# Patient Record
Sex: FEMALE | Race: WHITE | NOT HISPANIC OR LATINO | Employment: OTHER | URBAN - NONMETROPOLITAN AREA
[De-identification: names, ages, dates, MRNs, and addresses within clinical notes are randomized per-mention and may not be internally consistent; named-entity substitution may affect disease eponyms.]

---

## 2020-08-21 ENCOUNTER — TRANSCRIBE ORDERS (OUTPATIENT)
Dept: ADMINISTRATIVE | Facility: HOSPITAL | Age: 70
End: 2020-08-21

## 2020-08-21 ENCOUNTER — LAB (OUTPATIENT)
Dept: LAB | Facility: HOSPITAL | Age: 70
End: 2020-08-21

## 2020-08-21 DIAGNOSIS — Z01.818 PRE-OP TESTING: ICD-10-CM

## 2020-08-21 DIAGNOSIS — Z01.818 PRE-OP TESTING: Primary | ICD-10-CM

## 2020-08-21 PROCEDURE — C9803 HOPD COVID-19 SPEC COLLECT: HCPCS

## 2020-08-21 PROCEDURE — U0003 INFECTIOUS AGENT DETECTION BY NUCLEIC ACID (DNA OR RNA); SEVERE ACUTE RESPIRATORY SYNDROME CORONAVIRUS 2 (SARS-COV-2) (CORONAVIRUS DISEASE [COVID-19]), AMPLIFIED PROBE TECHNIQUE, MAKING USE OF HIGH THROUGHPUT TECHNOLOGIES AS DESCRIBED BY CMS-2020-01-R: HCPCS

## 2020-08-22 LAB
COVID LABCORP PRIORITY: NORMAL
SARS-COV-2 RNA RESP QL NAA+PROBE: NOT DETECTED

## 2020-12-15 ENCOUNTER — LAB (OUTPATIENT)
Dept: LAB | Facility: HOSPITAL | Age: 70
End: 2020-12-15

## 2020-12-15 DIAGNOSIS — Z01.818 PREOP TESTING: Primary | ICD-10-CM

## 2020-12-15 LAB — SARS-COV-2 ORF1AB RESP QL NAA+PROBE: NOT DETECTED

## 2020-12-15 PROCEDURE — U0004 COV-19 TEST NON-CDC HGH THRU: HCPCS

## 2020-12-15 PROCEDURE — C9803 HOPD COVID-19 SPEC COLLECT: HCPCS

## 2021-01-01 ENCOUNTER — APPOINTMENT (OUTPATIENT)
Dept: MRI IMAGING | Age: 71
DRG: 064 | End: 2021-01-01
Attending: FAMILY MEDICINE
Payer: MEDICARE

## 2021-01-01 ENCOUNTER — APPOINTMENT (OUTPATIENT)
Dept: GENERAL RADIOLOGY | Age: 71
DRG: 064 | End: 2021-01-01
Attending: FAMILY MEDICINE
Payer: MEDICARE

## 2021-01-01 ENCOUNTER — APPOINTMENT (OUTPATIENT)
Dept: ULTRASOUND IMAGING | Age: 71
DRG: 064 | End: 2021-01-01
Attending: FAMILY MEDICINE
Payer: MEDICARE

## 2021-01-01 ENCOUNTER — HOSPITAL ENCOUNTER (INPATIENT)
Age: 71
LOS: 4 days | Discharge: HOSPICE | DRG: 064 | End: 2021-05-31
Attending: FAMILY MEDICINE | Admitting: INTERNAL MEDICINE
Payer: MEDICARE

## 2021-01-01 VITALS
DIASTOLIC BLOOD PRESSURE: 57 MMHG | SYSTOLIC BLOOD PRESSURE: 160 MMHG | WEIGHT: 240 LBS | BODY MASS INDEX: 36.37 KG/M2 | OXYGEN SATURATION: 98 % | HEART RATE: 73 BPM | RESPIRATION RATE: 16 BRPM | TEMPERATURE: 97.8 F | HEIGHT: 68 IN

## 2021-01-01 DIAGNOSIS — G93.40 ACUTE ENCEPHALOPATHY: Primary | ICD-10-CM

## 2021-01-01 LAB
ADENOVIRUS BY PCR: NOT DETECTED
ALBUMIN SERPL-MCNC: 3.2 G/DL (ref 3.5–5.2)
ALBUMIN SERPL-MCNC: 3.7 G/DL (ref 3.5–5.2)
ALP BLD-CCNC: 110 U/L (ref 35–104)
ALP BLD-CCNC: 86 U/L (ref 35–104)
ALT SERPL-CCNC: 11 U/L (ref 5–33)
ALT SERPL-CCNC: 15 U/L (ref 5–33)
ANION GAP SERPL CALCULATED.3IONS-SCNC: 10 MMOL/L (ref 7–19)
ANION GAP SERPL CALCULATED.3IONS-SCNC: 12 MMOL/L (ref 7–19)
ANION GAP SERPL CALCULATED.3IONS-SCNC: 12 MMOL/L (ref 7–19)
ANION GAP SERPL CALCULATED.3IONS-SCNC: 13 MMOL/L (ref 7–19)
ANION GAP SERPL CALCULATED.3IONS-SCNC: 15 MMOL/L (ref 7–19)
AST SERPL-CCNC: 21 U/L (ref 5–32)
AST SERPL-CCNC: 23 U/L (ref 5–32)
BACTERIA: ABNORMAL /HPF
BASE EXCESS ARTERIAL: -14.6 MMOL/L (ref -2–2)
BASE EXCESS ARTERIAL: -6.4 MMOL/L (ref -2–2)
BASOPHILS ABSOLUTE: 0 K/UL (ref 0–0.2)
BASOPHILS ABSOLUTE: 0.1 K/UL (ref 0–0.2)
BASOPHILS ABSOLUTE: 0.1 K/UL (ref 0–0.2)
BASOPHILS RELATIVE PERCENT: 0.2 % (ref 0–1)
BASOPHILS RELATIVE PERCENT: 0.4 % (ref 0–1)
BILIRUB SERPL-MCNC: 0.6 MG/DL (ref 0.2–1.2)
BILIRUB SERPL-MCNC: 0.9 MG/DL (ref 0.2–1.2)
BILIRUBIN URINE: NEGATIVE
BLOOD CULTURE, ROUTINE: NORMAL
BLOOD, URINE: ABNORMAL
BORDETELLA PARAPERTUSSIS BY PCR: NOT DETECTED
BORDETELLA PERTUSSIS BY PCR: NOT DETECTED
BUN BLDV-MCNC: 35 MG/DL (ref 8–23)
BUN BLDV-MCNC: 39 MG/DL (ref 8–23)
BUN BLDV-MCNC: 61 MG/DL (ref 8–23)
BUN BLDV-MCNC: 72 MG/DL (ref 8–23)
BUN BLDV-MCNC: 76 MG/DL (ref 8–23)
CALCIUM SERPL-MCNC: 8.5 MG/DL (ref 8.8–10.2)
CALCIUM SERPL-MCNC: 8.8 MG/DL (ref 8.8–10.2)
CALCIUM SERPL-MCNC: 9 MG/DL (ref 8.8–10.2)
CALCIUM SERPL-MCNC: 9 MG/DL (ref 8.8–10.2)
CALCIUM SERPL-MCNC: 9.2 MG/DL (ref 8.8–10.2)
CARBOXYHEMOGLOBIN ARTERIAL: 1.6 % (ref 0–5)
CARBOXYHEMOGLOBIN ARTERIAL: 1.8 % (ref 0–5)
CHLAMYDOPHILIA PNEUMONIAE BY PCR: NOT DETECTED
CHLORIDE BLD-SCNC: 107 MMOL/L (ref 98–111)
CHLORIDE BLD-SCNC: 109 MMOL/L (ref 98–111)
CHLORIDE BLD-SCNC: 109 MMOL/L (ref 98–111)
CHLORIDE BLD-SCNC: 110 MMOL/L (ref 98–111)
CHLORIDE BLD-SCNC: 112 MMOL/L (ref 98–111)
CHOLESTEROL, TOTAL: 123 MG/DL (ref 160–199)
CLARITY: ABNORMAL
CO2: 14 MMOL/L (ref 22–29)
CO2: 19 MMOL/L (ref 22–29)
CO2: 21 MMOL/L (ref 22–29)
CO2: 22 MMOL/L (ref 22–29)
CO2: 22 MMOL/L (ref 22–29)
COLOR: YELLOW
CORONAVIRUS 229E BY PCR: NOT DETECTED
CORONAVIRUS HKU1 BY PCR: NOT DETECTED
CORONAVIRUS NL63 BY PCR: NOT DETECTED
CORONAVIRUS OC43 BY PCR: NOT DETECTED
CREAT SERPL-MCNC: 1.3 MG/DL (ref 0.5–0.9)
CREAT SERPL-MCNC: 1.4 MG/DL (ref 0.5–0.9)
CREAT SERPL-MCNC: 2 MG/DL (ref 0.5–0.9)
CREAT SERPL-MCNC: 2.5 MG/DL (ref 0.5–0.9)
CREAT SERPL-MCNC: 2.5 MG/DL (ref 0.5–0.9)
CREATININE URINE: 94.2 MG/DL (ref 4.2–622)
CULTURE, BLOOD 2: NORMAL
CULTURE, RESPIRATORY: NORMAL
EOSINOPHIL,URINE: NORMAL
EOSINOPHILS ABSOLUTE: 0 K/UL (ref 0–0.6)
EOSINOPHILS ABSOLUTE: 0.1 K/UL (ref 0–0.6)
EOSINOPHILS RELATIVE PERCENT: 0 % (ref 0–5)
EOSINOPHILS RELATIVE PERCENT: 0.3 % (ref 0–5)
EPITHELIAL CELLS, UA: ABNORMAL /HPF
GFR AFRICAN AMERICAN: 23
GFR AFRICAN AMERICAN: 23
GFR AFRICAN AMERICAN: 30
GFR AFRICAN AMERICAN: 45
GFR AFRICAN AMERICAN: 49
GFR NON-AFRICAN AMERICAN: 19
GFR NON-AFRICAN AMERICAN: 19
GFR NON-AFRICAN AMERICAN: 25
GFR NON-AFRICAN AMERICAN: 37
GFR NON-AFRICAN AMERICAN: 41
GLUCOSE BLD-MCNC: 110 MG/DL (ref 70–99)
GLUCOSE BLD-MCNC: 114 MG/DL (ref 70–99)
GLUCOSE BLD-MCNC: 123 MG/DL (ref 70–99)
GLUCOSE BLD-MCNC: 149 MG/DL (ref 70–99)
GLUCOSE BLD-MCNC: 150 MG/DL (ref 74–109)
GLUCOSE BLD-MCNC: 151 MG/DL (ref 70–99)
GLUCOSE BLD-MCNC: 151 MG/DL (ref 70–99)
GLUCOSE BLD-MCNC: 166 MG/DL (ref 70–99)
GLUCOSE BLD-MCNC: 174 MG/DL (ref 70–99)
GLUCOSE BLD-MCNC: 175 MG/DL (ref 74–109)
GLUCOSE BLD-MCNC: 184 MG/DL (ref 74–109)
GLUCOSE BLD-MCNC: 189 MG/DL (ref 74–109)
GLUCOSE BLD-MCNC: 195 MG/DL (ref 70–99)
GLUCOSE BLD-MCNC: 201 MG/DL (ref 74–109)
GLUCOSE BLD-MCNC: 245 MG/DL (ref 70–99)
GLUCOSE URINE: NEGATIVE MG/DL
GRAM STAIN RESULT: NORMAL
HBA1C MFR BLD: 6.3 % (ref 4–6)
HCO3 ARTERIAL: 13.2 MMOL/L (ref 22–26)
HCO3 ARTERIAL: 17.7 MMOL/L (ref 22–26)
HCT VFR BLD CALC: 29.4 % (ref 37–47)
HCT VFR BLD CALC: 30.2 % (ref 37–47)
HCT VFR BLD CALC: 31.4 % (ref 37–47)
HCT VFR BLD CALC: 33 % (ref 37–47)
HCT VFR BLD CALC: 37.8 % (ref 37–47)
HDLC SERPL-MCNC: 56 MG/DL (ref 65–121)
HEMOGLOBIN, ART, EXTENDED: 11.1 G/DL (ref 12–16)
HEMOGLOBIN, ART, EXTENDED: 12.6 G/DL (ref 12–16)
HEMOGLOBIN: 10.1 G/DL (ref 12–16)
HEMOGLOBIN: 10.5 G/DL (ref 12–16)
HEMOGLOBIN: 11.9 G/DL (ref 12–16)
HEMOGLOBIN: 9.7 G/DL (ref 12–16)
HEMOGLOBIN: 9.7 G/DL (ref 12–16)
HUMAN METAPNEUMOVIRUS BY PCR: NOT DETECTED
HUMAN RHINOVIRUS/ENTEROVIRUS BY PCR: NOT DETECTED
HYALINE CASTS: ABNORMAL /LPF (ref 0–5)
IMMATURE GRANULOCYTES #: 0.2 K/UL
IMMATURE GRANULOCYTES #: 0.2 K/UL
IMMATURE GRANULOCYTES #: 0.4 K/UL
IMMATURE GRANULOCYTES #: 0.4 K/UL
IMMATURE GRANULOCYTES #: 0.8 K/UL
INFLUENZA A BY PCR: NOT DETECTED
INFLUENZA B BY PCR: NOT DETECTED
KETONES, URINE: ABNORMAL MG/DL
L. PNEUMOPHILA SEROGP 1 UR AG: NORMAL
LACTIC ACID: 2 MMOL/L (ref 0.5–1.9)
LDL CHOLESTEROL CALCULATED: 49 MG/DL
LEUKOCYTE ESTERASE, URINE: ABNORMAL
LV EF: 58 %
LVEF MODALITY: NORMAL
LYMPHOCYTES ABSOLUTE: 0.7 K/UL (ref 1.1–4.5)
LYMPHOCYTES ABSOLUTE: 0.9 K/UL (ref 1.1–4.5)
LYMPHOCYTES ABSOLUTE: 0.9 K/UL (ref 1.1–4.5)
LYMPHOCYTES ABSOLUTE: 1 K/UL (ref 1.1–4.5)
LYMPHOCYTES ABSOLUTE: 1 K/UL (ref 1.1–4.5)
LYMPHOCYTES RELATIVE PERCENT: 2.6 % (ref 20–40)
LYMPHOCYTES RELATIVE PERCENT: 3.2 % (ref 20–40)
LYMPHOCYTES RELATIVE PERCENT: 4.1 % (ref 20–40)
LYMPHOCYTES RELATIVE PERCENT: 4.1 % (ref 20–40)
LYMPHOCYTES RELATIVE PERCENT: 5.4 % (ref 20–40)
MAGNESIUM: 1.8 MG/DL (ref 1.6–2.4)
MAGNESIUM: 1.8 MG/DL (ref 1.6–2.4)
MAGNESIUM: 1.9 MG/DL (ref 1.6–2.4)
MAGNESIUM: 2 MG/DL (ref 1.6–2.4)
MAGNESIUM: 2.2 MG/DL (ref 1.6–2.4)
MCH RBC QN AUTO: 30.3 PG (ref 27–31)
MCH RBC QN AUTO: 30.5 PG (ref 27–31)
MCH RBC QN AUTO: 30.7 PG (ref 27–31)
MCH RBC QN AUTO: 30.7 PG (ref 27–31)
MCH RBC QN AUTO: 31.1 PG (ref 27–31)
MCHC RBC AUTO-ENTMCNC: 31.5 G/DL (ref 33–37)
MCHC RBC AUTO-ENTMCNC: 31.8 G/DL (ref 33–37)
MCHC RBC AUTO-ENTMCNC: 32.1 G/DL (ref 33–37)
MCHC RBC AUTO-ENTMCNC: 32.2 G/DL (ref 33–37)
MCHC RBC AUTO-ENTMCNC: 33 G/DL (ref 33–37)
MCV RBC AUTO: 94.2 FL (ref 81–99)
MCV RBC AUTO: 95.1 FL (ref 81–99)
MCV RBC AUTO: 95.4 FL (ref 81–99)
MCV RBC AUTO: 95.6 FL (ref 81–99)
MCV RBC AUTO: 96.9 FL (ref 81–99)
METHEMOGLOBIN ARTERIAL: 1.1 %
METHEMOGLOBIN ARTERIAL: 1.6 %
MONOCYTES ABSOLUTE: 0.7 K/UL (ref 0–0.9)
MONOCYTES ABSOLUTE: 0.7 K/UL (ref 0–0.9)
MONOCYTES ABSOLUTE: 0.8 K/UL (ref 0–0.9)
MONOCYTES ABSOLUTE: 1 K/UL (ref 0–0.9)
MONOCYTES ABSOLUTE: 1 K/UL (ref 0–0.9)
MONOCYTES RELATIVE PERCENT: 2.9 % (ref 0–10)
MONOCYTES RELATIVE PERCENT: 3.2 % (ref 0–10)
MONOCYTES RELATIVE PERCENT: 3.6 % (ref 0–10)
MONOCYTES RELATIVE PERCENT: 3.9 % (ref 0–10)
MONOCYTES RELATIVE PERCENT: 4.3 % (ref 0–10)
MYCOPLASMA PNEUMONIAE BY PCR: NOT DETECTED
MYCOPLASMA PNEUMONIAE IGG: 0.08 U/L
MYCOPLASMA PNEUMONIAE IGM: 0.1 U/L
NEUTROPHILS ABSOLUTE: 17.4 K/UL (ref 1.5–7.5)
NEUTROPHILS ABSOLUTE: 18.1 K/UL (ref 1.5–7.5)
NEUTROPHILS ABSOLUTE: 19.4 K/UL (ref 1.5–7.5)
NEUTROPHILS ABSOLUTE: 22.2 K/UL (ref 1.5–7.5)
NEUTROPHILS ABSOLUTE: 31.7 K/UL (ref 1.5–7.5)
NEUTROPHILS RELATIVE PERCENT: 88.7 % (ref 50–65)
NEUTROPHILS RELATIVE PERCENT: 89.2 % (ref 50–65)
NEUTROPHILS RELATIVE PERCENT: 90.7 % (ref 50–65)
NEUTROPHILS RELATIVE PERCENT: 91 % (ref 50–65)
NEUTROPHILS RELATIVE PERCENT: 93.1 % (ref 50–65)
NITRITE, URINE: NEGATIVE
O2 CONTENT ARTERIAL: 15.6 ML/DL
O2 CONTENT ARTERIAL: 17.5 ML/DL
O2 SAT, ARTERIAL: 96.6 %
O2 SAT, ARTERIAL: 97.6 %
O2 THERAPY: ABNORMAL
O2 THERAPY: ABNORMAL
PARAINFLUENZA VIRUS 1 BY PCR: NOT DETECTED
PARAINFLUENZA VIRUS 2 BY PCR: NOT DETECTED
PARAINFLUENZA VIRUS 3 BY PCR: NOT DETECTED
PARAINFLUENZA VIRUS 4 BY PCR: NOT DETECTED
PARATHYROID HORMONE INTACT: 105.1 PG/ML (ref 15–65)
PCO2 ARTERIAL: 30 MMHG (ref 35–45)
PCO2 ARTERIAL: 37 MMHG (ref 35–45)
PDW BLD-RTO: 13 % (ref 11.5–14.5)
PDW BLD-RTO: 13.1 % (ref 11.5–14.5)
PDW BLD-RTO: 13.2 % (ref 11.5–14.5)
PERFORMED ON: ABNORMAL
PH ARTERIAL: 7.16 (ref 7.35–7.45)
PH ARTERIAL: 7.38 (ref 7.35–7.45)
PH UA: 5 (ref 5–8)
PHOSPHORUS: 3.2 MG/DL (ref 2.5–4.5)
PHOSPHORUS: 4.2 MG/DL (ref 2.5–4.5)
PLATELET # BLD: 137 K/UL (ref 130–400)
PLATELET # BLD: 144 K/UL (ref 130–400)
PLATELET # BLD: 151 K/UL (ref 130–400)
PLATELET # BLD: 183 K/UL (ref 130–400)
PLATELET # BLD: 235 K/UL (ref 130–400)
PMV BLD AUTO: 10.5 FL (ref 9.4–12.3)
PMV BLD AUTO: 10.8 FL (ref 9.4–12.3)
PMV BLD AUTO: 10.8 FL (ref 9.4–12.3)
PMV BLD AUTO: 10.9 FL (ref 9.4–12.3)
PMV BLD AUTO: 9.9 FL (ref 9.4–12.3)
PO2 ARTERIAL: 142 MMHG (ref 80–100)
PO2 ARTERIAL: 233 MMHG (ref 80–100)
POTASSIUM SERPL-SCNC: 4.2 MMOL/L (ref 3.5–5)
POTASSIUM SERPL-SCNC: 4.6 MMOL/L (ref 3.5–5)
POTASSIUM SERPL-SCNC: 4.8 MMOL/L (ref 3.5–5)
POTASSIUM SERPL-SCNC: 5.7 MMOL/L (ref 3.5–5)
POTASSIUM SERPL-SCNC: 6.4 MMOL/L (ref 3.5–5)
POTASSIUM, WHOLE BLOOD: 5.8
POTASSIUM, WHOLE BLOOD: 6.7
PROTEIN PROTEIN: 28 MG/DL (ref 15–45)
PROTEIN UA: 30 MG/DL
RBC # BLD: 3.12 M/UL (ref 4.2–5.4)
RBC # BLD: 3.16 M/UL (ref 4.2–5.4)
RBC # BLD: 3.29 M/UL (ref 4.2–5.4)
RBC # BLD: 3.47 M/UL (ref 4.2–5.4)
RBC # BLD: 3.9 M/UL (ref 4.2–5.4)
RBC UA: ABNORMAL /HPF (ref 0–2)
RESPIRATORY SYNCYTIAL VIRUS BY PCR: NOT DETECTED
SARS-COV-2, NAAT: NOT DETECTED
SARS-COV-2, PCR: NOT DETECTED
SODIUM BLD-SCNC: 136 MMOL/L (ref 136–145)
SODIUM BLD-SCNC: 140 MMOL/L (ref 136–145)
SODIUM BLD-SCNC: 141 MMOL/L (ref 136–145)
SODIUM BLD-SCNC: 144 MMOL/L (ref 136–145)
SODIUM BLD-SCNC: 146 MMOL/L (ref 136–145)
SODIUM URINE: 43 MMOL/L
SPECIFIC GRAVITY UA: 1.02 (ref 1–1.03)
STREP PNEUMONIAE ANTIGEN, URINE: NORMAL
TOTAL PROTEIN: 6.5 G/DL (ref 6.6–8.7)
TOTAL PROTEIN: 6.9 G/DL (ref 6.6–8.7)
TRIGL SERPL-MCNC: 89 MG/DL (ref 0–149)
TSH REFLEX FT4: 0.44 UIU/ML (ref 0.35–5.5)
UREA NITROGEN, UR: 933 MG/DL
UROBILINOGEN, URINE: 1 E.U./DL
VITAMIN D 25-HYDROXY: 28.7 NG/ML
WBC # BLD: 19.4 K/UL (ref 4.8–10.8)
WBC # BLD: 20.4 K/UL (ref 4.8–10.8)
WBC # BLD: 21.4 K/UL (ref 4.8–10.8)
WBC # BLD: 24.4 K/UL (ref 4.8–10.8)
WBC # BLD: 34 K/UL (ref 4.8–10.8)
WBC UA: ABNORMAL /HPF (ref 0–5)

## 2021-01-01 PROCEDURE — 84100 ASSAY OF PHOSPHORUS: CPT

## 2021-01-01 PROCEDURE — 2580000003 HC RX 258: Performed by: INTERNAL MEDICINE

## 2021-01-01 PROCEDURE — 94003 VENT MGMT INPAT SUBQ DAY: CPT

## 2021-01-01 PROCEDURE — 6360000002 HC RX W HCPCS: Performed by: INTERNAL MEDICINE

## 2021-01-01 PROCEDURE — 74018 RADEX ABDOMEN 1 VIEW: CPT

## 2021-01-01 PROCEDURE — 95816 EEG AWAKE AND DROWSY: CPT | Performed by: PSYCHIATRY & NEUROLOGY

## 2021-01-01 PROCEDURE — 87635 SARS-COV-2 COVID-19 AMP PRB: CPT

## 2021-01-01 PROCEDURE — 99233 SBSQ HOSP IP/OBS HIGH 50: CPT | Performed by: PSYCHIATRY & NEUROLOGY

## 2021-01-01 PROCEDURE — 80061 LIPID PANEL: CPT

## 2021-01-01 PROCEDURE — 80048 BASIC METABOLIC PNL TOTAL CA: CPT

## 2021-01-01 PROCEDURE — 83735 ASSAY OF MAGNESIUM: CPT

## 2021-01-01 PROCEDURE — 2700000000 HC OXYGEN THERAPY PER DAY

## 2021-01-01 PROCEDURE — 36415 COLL VENOUS BLD VENIPUNCTURE: CPT

## 2021-01-01 PROCEDURE — 83036 HEMOGLOBIN GLYCOSYLATED A1C: CPT

## 2021-01-01 PROCEDURE — 85025 COMPLETE CBC W/AUTO DIFF WBC: CPT

## 2021-01-01 PROCEDURE — 84300 ASSAY OF URINE SODIUM: CPT

## 2021-01-01 PROCEDURE — 87449 NOS EACH ORGANISM AG IA: CPT

## 2021-01-01 PROCEDURE — 6370000000 HC RX 637 (ALT 250 FOR IP): Performed by: INTERNAL MEDICINE

## 2021-01-01 PROCEDURE — 2000000000 HC ICU R&B

## 2021-01-01 PROCEDURE — 5A1945Z RESPIRATORY VENTILATION, 24-96 CONSECUTIVE HOURS: ICD-10-PCS | Performed by: INTERNAL MEDICINE

## 2021-01-01 PROCEDURE — 99223 1ST HOSP IP/OBS HIGH 75: CPT | Performed by: NURSE PRACTITIONER

## 2021-01-01 PROCEDURE — 87205 SMEAR GRAM STAIN: CPT

## 2021-01-01 PROCEDURE — 2500000003 HC RX 250 WO HCPCS: Performed by: INTERNAL MEDICINE

## 2021-01-01 PROCEDURE — C8929 TTE W OR WO FOL WCON,DOPPLER: HCPCS

## 2021-01-01 PROCEDURE — 76770 US EXAM ABDO BACK WALL COMP: CPT

## 2021-01-01 PROCEDURE — 84132 ASSAY OF SERUM POTASSIUM: CPT

## 2021-01-01 PROCEDURE — 83605 ASSAY OF LACTIC ACID: CPT

## 2021-01-01 PROCEDURE — 36600 WITHDRAWAL OF ARTERIAL BLOOD: CPT

## 2021-01-01 PROCEDURE — 81001 URINALYSIS AUTO W/SCOPE: CPT

## 2021-01-01 PROCEDURE — 86738 MYCOPLASMA ANTIBODY: CPT

## 2021-01-01 PROCEDURE — 36556 INSERT NON-TUNNEL CV CATH: CPT

## 2021-01-01 PROCEDURE — 0202U NFCT DS 22 TRGT SARS-COV-2: CPT

## 2021-01-01 PROCEDURE — 95819 EEG AWAKE AND ASLEEP: CPT

## 2021-01-01 PROCEDURE — 70551 MRI BRAIN STEM W/O DYE: CPT

## 2021-01-01 PROCEDURE — 84540 ASSAY OF URINE/UREA-N: CPT

## 2021-01-01 PROCEDURE — 31500 INSERT EMERGENCY AIRWAY: CPT

## 2021-01-01 PROCEDURE — 71045 X-RAY EXAM CHEST 1 VIEW: CPT

## 2021-01-01 PROCEDURE — 87070 CULTURE OTHR SPECIMN AEROBIC: CPT

## 2021-01-01 PROCEDURE — 84443 ASSAY THYROID STIM HORMONE: CPT

## 2021-01-01 PROCEDURE — 99223 1ST HOSP IP/OBS HIGH 75: CPT | Performed by: PSYCHIATRY & NEUROLOGY

## 2021-01-01 PROCEDURE — 0BH17EZ INSERTION OF ENDOTRACHEAL AIRWAY INTO TRACHEA, VIA NATURAL OR ARTIFICIAL OPENING: ICD-10-PCS | Performed by: INTERNAL MEDICINE

## 2021-01-01 PROCEDURE — 82803 BLOOD GASES ANY COMBINATION: CPT

## 2021-01-01 PROCEDURE — 82306 VITAMIN D 25 HYDROXY: CPT

## 2021-01-01 PROCEDURE — 80053 COMPREHEN METABOLIC PANEL: CPT

## 2021-01-01 PROCEDURE — 83970 ASSAY OF PARATHORMONE: CPT

## 2021-01-01 PROCEDURE — 82947 ASSAY GLUCOSE BLOOD QUANT: CPT

## 2021-01-01 PROCEDURE — 6360000004 HC RX CONTRAST MEDICATION: Performed by: INTERNAL MEDICINE

## 2021-01-01 PROCEDURE — 2500000003 HC RX 250 WO HCPCS

## 2021-01-01 PROCEDURE — 82570 ASSAY OF URINE CREATININE: CPT

## 2021-01-01 PROCEDURE — 87040 BLOOD CULTURE FOR BACTERIA: CPT

## 2021-01-01 PROCEDURE — 02HV33Z INSERTION OF INFUSION DEVICE INTO SUPERIOR VENA CAVA, PERCUTANEOUS APPROACH: ICD-10-PCS | Performed by: INTERNAL MEDICINE

## 2021-01-01 PROCEDURE — 6360000002 HC RX W HCPCS

## 2021-01-01 PROCEDURE — 84156 ASSAY OF PROTEIN URINE: CPT

## 2021-01-01 PROCEDURE — 94002 VENT MGMT INPAT INIT DAY: CPT

## 2021-01-01 PROCEDURE — 93880 EXTRACRANIAL BILAT STUDY: CPT

## 2021-01-01 RX ORDER — SODIUM CHLORIDE, SODIUM LACTATE, POTASSIUM CHLORIDE, CALCIUM CHLORIDE 600; 310; 30; 20 MG/100ML; MG/100ML; MG/100ML; MG/100ML
INJECTION, SOLUTION INTRAVENOUS CONTINUOUS
Status: DISCONTINUED | OUTPATIENT
Start: 2021-01-01 | End: 2021-01-01

## 2021-01-01 RX ORDER — GLIPIZIDE 5 MG/1
10 TABLET ORAL DAILY
COMMUNITY

## 2021-01-01 RX ORDER — LEVOTHYROXINE SODIUM 112 UG/1
112 TABLET ORAL DAILY
COMMUNITY

## 2021-01-01 RX ORDER — SIMVASTATIN 20 MG
20 TABLET ORAL DAILY
COMMUNITY

## 2021-01-01 RX ORDER — ERGOCALCIFEROL 1.25 MG/1
50000 CAPSULE ORAL WEEKLY
Status: DISCONTINUED | OUTPATIENT
Start: 2021-01-01 | End: 2021-01-01 | Stop reason: HOSPADM

## 2021-01-01 RX ORDER — HYDROCODONE BITARTRATE AND ACETAMINOPHEN 7.5; 325 MG/1; MG/1
1 TABLET ORAL 3 TIMES DAILY
COMMUNITY

## 2021-01-01 RX ORDER — ATORVASTATIN CALCIUM 10 MG/1
10 TABLET, FILM COATED ORAL NIGHTLY
Status: DISCONTINUED | OUTPATIENT
Start: 2021-01-01 | End: 2021-01-01

## 2021-01-01 RX ORDER — ALPRAZOLAM 0.5 MG/1
0.5 TABLET ORAL 3 TIMES DAILY PRN
COMMUNITY

## 2021-01-01 RX ORDER — CHOLECALCIFEROL (VITAMIN D3) 1250 MCG
1 CAPSULE ORAL DAILY
Status: DISCONTINUED | OUTPATIENT
Start: 2021-01-01 | End: 2021-01-01

## 2021-01-01 RX ORDER — PANTOPRAZOLE SODIUM 40 MG/1
40 TABLET, DELAYED RELEASE ORAL 2 TIMES DAILY
COMMUNITY

## 2021-01-01 RX ORDER — SPIRONOLACTONE 25 MG/1
25 TABLET ORAL 2 TIMES DAILY
COMMUNITY

## 2021-01-01 RX ORDER — ATROPINE SULFATE 10 MG/ML
1 SOLUTION/ DROPS OPHTHALMIC EVERY 4 HOURS PRN
Status: DISCONTINUED | OUTPATIENT
Start: 2021-01-01 | End: 2021-01-01 | Stop reason: HOSPADM

## 2021-01-01 RX ORDER — SPIRONOLACTONE 25 MG/1
25 TABLET ORAL 2 TIMES DAILY
Status: DISCONTINUED | OUTPATIENT
Start: 2021-01-01 | End: 2021-01-01 | Stop reason: HOSPADM

## 2021-01-01 RX ORDER — CLONIDINE 0.3 MG/24H
1 PATCH, EXTENDED RELEASE TRANSDERMAL WEEKLY
COMMUNITY

## 2021-01-01 RX ORDER — METOPROLOL SUCCINATE 50 MG/1
100 TABLET, EXTENDED RELEASE ORAL DAILY
Status: DISCONTINUED | OUTPATIENT
Start: 2021-01-01 | End: 2021-01-01

## 2021-01-01 RX ORDER — DEXTROSE MONOHYDRATE 50 MG/ML
100 INJECTION, SOLUTION INTRAVENOUS PRN
Status: DISCONTINUED | OUTPATIENT
Start: 2021-01-01 | End: 2021-01-01 | Stop reason: SDUPTHER

## 2021-01-01 RX ORDER — PAROXETINE HYDROCHLORIDE 20 MG/1
20 TABLET, FILM COATED ORAL DAILY
Status: DISCONTINUED | OUTPATIENT
Start: 2021-01-01 | End: 2021-01-01 | Stop reason: HOSPADM

## 2021-01-01 RX ORDER — HYDRALAZINE HYDROCHLORIDE 20 MG/ML
10 INJECTION INTRAMUSCULAR; INTRAVENOUS EVERY 6 HOURS PRN
Status: DISCONTINUED | OUTPATIENT
Start: 2021-01-01 | End: 2021-01-01 | Stop reason: HOSPADM

## 2021-01-01 RX ORDER — DEXTROSE MONOHYDRATE 50 MG/ML
100 INJECTION, SOLUTION INTRAVENOUS PRN
Status: DISCONTINUED | OUTPATIENT
Start: 2021-01-01 | End: 2021-01-01 | Stop reason: HOSPADM

## 2021-01-01 RX ORDER — DEXTROSE MONOHYDRATE 25 G/50ML
12.5 INJECTION, SOLUTION INTRAVENOUS PRN
Status: DISCONTINUED | OUTPATIENT
Start: 2021-01-01 | End: 2021-01-01 | Stop reason: SDUPTHER

## 2021-01-01 RX ORDER — NICOTINE POLACRILEX 4 MG
15 LOZENGE BUCCAL PRN
Status: DISCONTINUED | OUTPATIENT
Start: 2021-01-01 | End: 2021-01-01 | Stop reason: SDUPTHER

## 2021-01-01 RX ORDER — CALCIUM GLUCONATE 94 MG/ML
1000 INJECTION, SOLUTION INTRAVENOUS ONCE
Status: COMPLETED | OUTPATIENT
Start: 2021-01-01 | End: 2021-01-01

## 2021-01-01 RX ORDER — PROPOFOL 10 MG/ML
5-50 INJECTION, EMULSION INTRAVENOUS
Status: DISCONTINUED | OUTPATIENT
Start: 2021-01-01 | End: 2021-01-01 | Stop reason: HOSPADM

## 2021-01-01 RX ORDER — PROPOFOL 10 MG/ML
INJECTION, EMULSION INTRAVENOUS
Status: COMPLETED
Start: 2021-01-01 | End: 2021-01-01

## 2021-01-01 RX ORDER — FUROSEMIDE 20 MG/1
20 TABLET ORAL DAILY
Status: DISCONTINUED | OUTPATIENT
Start: 2021-01-01 | End: 2021-01-01 | Stop reason: HOSPADM

## 2021-01-01 RX ORDER — LISINOPRIL 20 MG/1
20 TABLET ORAL 2 TIMES DAILY
COMMUNITY

## 2021-01-01 RX ORDER — ATORVASTATIN CALCIUM 40 MG/1
40 TABLET, FILM COATED ORAL NIGHTLY
Status: DISCONTINUED | OUTPATIENT
Start: 2021-01-01 | End: 2021-01-01

## 2021-01-01 RX ORDER — LABETALOL HYDROCHLORIDE 5 MG/ML
20 INJECTION, SOLUTION INTRAVENOUS EVERY 4 HOURS PRN
Status: DISCONTINUED | OUTPATIENT
Start: 2021-01-01 | End: 2021-01-01 | Stop reason: HOSPADM

## 2021-01-01 RX ORDER — LISINOPRIL 20 MG/1
20 TABLET ORAL DAILY
Status: DISCONTINUED | OUTPATIENT
Start: 2021-01-01 | End: 2021-01-01

## 2021-01-01 RX ORDER — METOPROLOL SUCCINATE 100 MG/1
100 TABLET, EXTENDED RELEASE ORAL DAILY
COMMUNITY

## 2021-01-01 RX ORDER — SCOLOPAMINE TRANSDERMAL SYSTEM 1 MG/1
1 PATCH, EXTENDED RELEASE TRANSDERMAL
Status: DISCONTINUED | OUTPATIENT
Start: 2021-01-01 | End: 2021-01-01 | Stop reason: HOSPADM

## 2021-01-01 RX ORDER — DEXTROSE MONOHYDRATE 25 G/50ML
12.5 INJECTION, SOLUTION INTRAVENOUS PRN
Status: DISCONTINUED | OUTPATIENT
Start: 2021-01-01 | End: 2021-01-01 | Stop reason: HOSPADM

## 2021-01-01 RX ORDER — LEVOTHYROXINE SODIUM 112 UG/1
112 TABLET ORAL DAILY
Status: DISCONTINUED | OUTPATIENT
Start: 2021-01-01 | End: 2021-01-01 | Stop reason: HOSPADM

## 2021-01-01 RX ORDER — FUROSEMIDE 20 MG/1
20 TABLET ORAL PRN
COMMUNITY

## 2021-01-01 RX ORDER — NICOTINE POLACRILEX 4 MG
15 LOZENGE BUCCAL PRN
Status: DISCONTINUED | OUTPATIENT
Start: 2021-01-01 | End: 2021-01-01 | Stop reason: HOSPADM

## 2021-01-01 RX ORDER — CHOLECALCIFEROL (VITAMIN D3) 1250 MCG
1 CAPSULE ORAL WEEKLY
COMMUNITY

## 2021-01-01 RX ORDER — ATORVASTATIN CALCIUM 20 MG/1
20 TABLET, FILM COATED ORAL NIGHTLY
Status: DISCONTINUED | OUTPATIENT
Start: 2021-01-01 | End: 2021-01-01 | Stop reason: HOSPADM

## 2021-01-01 RX ORDER — LISINOPRIL 20 MG/1
20 TABLET ORAL DAILY
Status: DISCONTINUED | OUTPATIENT
Start: 2021-01-01 | End: 2021-01-01 | Stop reason: HOSPADM

## 2021-01-01 RX ORDER — DEXTROSE MONOHYDRATE 25 G/50ML
25 INJECTION, SOLUTION INTRAVENOUS ONCE
Status: COMPLETED | OUTPATIENT
Start: 2021-01-01 | End: 2021-01-01

## 2021-01-01 RX ORDER — PAROXETINE HYDROCHLORIDE 20 MG/1
20 TABLET, FILM COATED ORAL DAILY
COMMUNITY

## 2021-01-01 RX ORDER — METOPROLOL TARTRATE 50 MG/1
50 TABLET, FILM COATED ORAL 2 TIMES DAILY
Status: DISCONTINUED | OUTPATIENT
Start: 2021-01-01 | End: 2021-01-01 | Stop reason: HOSPADM

## 2021-01-01 RX ORDER — CLONIDINE 0.1 MG/24H
1 PATCH, EXTENDED RELEASE TRANSDERMAL WEEKLY
Status: DISCONTINUED | OUTPATIENT
Start: 2021-01-01 | End: 2021-01-01 | Stop reason: HOSPADM

## 2021-01-01 RX ORDER — CLONIDINE 0.1 MG/24H
1 PATCH, EXTENDED RELEASE TRANSDERMAL WEEKLY
Status: DISCONTINUED | OUTPATIENT
Start: 2021-01-01 | End: 2021-01-01

## 2021-01-01 RX ADMIN — PIPERACILLIN SODIUM AND TAZOBACTAM SODIUM 3375 MG: 3; .375 INJECTION, POWDER, LYOPHILIZED, FOR SOLUTION INTRAVENOUS at 00:17

## 2021-01-01 RX ADMIN — INSULIN LISPRO 1 UNITS: 100 INJECTION, SOLUTION INTRAVENOUS; SUBCUTANEOUS at 20:39

## 2021-01-01 RX ADMIN — FAMOTIDINE 20 MG: 10 INJECTION, SOLUTION INTRAVENOUS at 20:38

## 2021-01-01 RX ADMIN — HYDRALAZINE HYDROCHLORIDE 10 MG: 20 INJECTION INTRAMUSCULAR; INTRAVENOUS at 04:04

## 2021-01-01 RX ADMIN — PIPERACILLIN SODIUM AND TAZOBACTAM SODIUM 3375 MG: 3; .375 INJECTION, POWDER, LYOPHILIZED, FOR SOLUTION INTRAVENOUS at 11:29

## 2021-01-01 RX ADMIN — METOPROLOL TARTRATE 50 MG: 50 TABLET, FILM COATED ORAL at 20:38

## 2021-01-01 RX ADMIN — PIPERACILLIN SODIUM AND TAZOBACTAM SODIUM 3375 MG: 3; .375 INJECTION, POWDER, LYOPHILIZED, FOR SOLUTION INTRAVENOUS at 11:02

## 2021-01-01 RX ADMIN — Medication 50 MEQ: at 16:45

## 2021-01-01 RX ADMIN — PERFLUTREN 1.65 MG: 6.52 INJECTION, SUSPENSION INTRAVENOUS at 13:50

## 2021-01-01 RX ADMIN — HYDRALAZINE HYDROCHLORIDE 10 MG: 20 INJECTION INTRAMUSCULAR; INTRAVENOUS at 21:48

## 2021-01-01 RX ADMIN — HYDRALAZINE HYDROCHLORIDE 10 MG: 20 INJECTION INTRAMUSCULAR; INTRAVENOUS at 10:07

## 2021-01-01 RX ADMIN — FAMOTIDINE 20 MG: 10 INJECTION, SOLUTION INTRAVENOUS at 07:10

## 2021-01-01 RX ADMIN — PIPERACILLIN SODIUM AND TAZOBACTAM SODIUM 3375 MG: 3; .375 INJECTION, POWDER, LYOPHILIZED, FOR SOLUTION INTRAVENOUS at 12:24

## 2021-01-01 RX ADMIN — PIPERACILLIN SODIUM AND TAZOBACTAM SODIUM 3375 MG: 3; .375 INJECTION, POWDER, LYOPHILIZED, FOR SOLUTION INTRAVENOUS at 05:41

## 2021-01-01 RX ADMIN — FAMOTIDINE 20 MG: 10 INJECTION, SOLUTION INTRAVENOUS at 22:04

## 2021-01-01 RX ADMIN — PIPERACILLIN SODIUM AND TAZOBACTAM SODIUM 3375 MG: 3; .375 INJECTION, POWDER, LYOPHILIZED, FOR SOLUTION INTRAVENOUS at 05:02

## 2021-01-01 RX ADMIN — PIPERACILLIN SODIUM AND TAZOBACTAM SODIUM 3375 MG: 3; .375 INJECTION, POWDER, LYOPHILIZED, FOR SOLUTION INTRAVENOUS at 16:34

## 2021-01-01 RX ADMIN — FAMOTIDINE 20 MG: 10 INJECTION, SOLUTION INTRAVENOUS at 07:35

## 2021-01-01 RX ADMIN — SODIUM CHLORIDE, POTASSIUM CHLORIDE, SODIUM LACTATE AND CALCIUM CHLORIDE: 600; 310; 30; 20 INJECTION, SOLUTION INTRAVENOUS at 03:33

## 2021-01-01 RX ADMIN — FAMOTIDINE 20 MG: 10 INJECTION, SOLUTION INTRAVENOUS at 08:25

## 2021-01-01 RX ADMIN — PIPERACILLIN SODIUM AND TAZOBACTAM SODIUM 3375 MG: 3; .375 INJECTION, POWDER, LYOPHILIZED, FOR SOLUTION INTRAVENOUS at 17:20

## 2021-01-01 RX ADMIN — ENOXAPARIN SODIUM 40 MG: 40 INJECTION SUBCUTANEOUS at 12:43

## 2021-01-01 RX ADMIN — PIPERACILLIN SODIUM AND TAZOBACTAM SODIUM 3375 MG: 3; .375 INJECTION, POWDER, LYOPHILIZED, FOR SOLUTION INTRAVENOUS at 16:43

## 2021-01-01 RX ADMIN — METOPROLOL SUCCINATE 100 MG: 50 TABLET, EXTENDED RELEASE ORAL at 16:34

## 2021-01-01 RX ADMIN — PIPERACILLIN SODIUM AND TAZOBACTAM SODIUM 3375 MG: 3; .375 INJECTION, POWDER, LYOPHILIZED, FOR SOLUTION INTRAVENOUS at 06:08

## 2021-01-01 RX ADMIN — PIPERACILLIN SODIUM AND TAZOBACTAM SODIUM 3375 MG: 3; .375 INJECTION, POWDER, LYOPHILIZED, FOR SOLUTION INTRAVENOUS at 12:43

## 2021-01-01 RX ADMIN — PIPERACILLIN SODIUM AND TAZOBACTAM SODIUM 3375 MG: 3; .375 INJECTION, POWDER, LYOPHILIZED, FOR SOLUTION INTRAVENOUS at 04:47

## 2021-01-01 RX ADMIN — INSULIN HUMAN 10 UNITS: 100 INJECTION, SOLUTION PARENTERAL at 17:21

## 2021-01-01 RX ADMIN — PROPOFOL 15 MCG/KG/MIN: 10 INJECTION, EMULSION INTRAVENOUS at 04:47

## 2021-01-01 RX ADMIN — Medication 20 MG: at 18:46

## 2021-01-01 RX ADMIN — METOPROLOL TARTRATE 50 MG: 50 TABLET, FILM COATED ORAL at 08:25

## 2021-01-01 RX ADMIN — PIPERACILLIN SODIUM AND TAZOBACTAM SODIUM 3375 MG: 3; .375 INJECTION, POWDER, LYOPHILIZED, FOR SOLUTION INTRAVENOUS at 23:11

## 2021-01-01 RX ADMIN — SODIUM CHLORIDE, POTASSIUM CHLORIDE, SODIUM LACTATE AND CALCIUM CHLORIDE: 600; 310; 30; 20 INJECTION, SOLUTION INTRAVENOUS at 00:01

## 2021-01-01 RX ADMIN — PROPOFOL 15 MCG/KG/MIN: 10 INJECTION, EMULSION INTRAVENOUS at 17:20

## 2021-01-01 RX ADMIN — PIPERACILLIN SODIUM AND TAZOBACTAM SODIUM 3375 MG: 3; .375 INJECTION, POWDER, LYOPHILIZED, FOR SOLUTION INTRAVENOUS at 22:40

## 2021-01-01 RX ADMIN — SODIUM BICARBONATE 50 MEQ: 84 INJECTION, SOLUTION INTRAVENOUS at 16:45

## 2021-01-01 RX ADMIN — LEVOTHYROXINE SODIUM 112 MCG: 112 TABLET ORAL at 08:25

## 2021-01-01 RX ADMIN — PROPOFOL 30 MCG/KG/MIN: 10 INJECTION, EMULSION INTRAVENOUS at 14:44

## 2021-01-01 RX ADMIN — LISINOPRIL 20 MG: 20 TABLET ORAL at 11:29

## 2021-01-01 RX ADMIN — SODIUM CHLORIDE, POTASSIUM CHLORIDE, SODIUM LACTATE AND CALCIUM CHLORIDE: 600; 310; 30; 20 INJECTION, SOLUTION INTRAVENOUS at 16:04

## 2021-01-01 RX ADMIN — LEVOTHYROXINE SODIUM 112 MCG: 112 TABLET ORAL at 08:09

## 2021-01-01 RX ADMIN — PROPOFOL 15 MCG/KG/MIN: 10 INJECTION, EMULSION INTRAVENOUS at 12:50

## 2021-01-01 RX ADMIN — Medication 20 MG: at 14:06

## 2021-01-01 RX ADMIN — HYDRALAZINE HYDROCHLORIDE 10 MG: 20 INJECTION INTRAMUSCULAR; INTRAVENOUS at 01:05

## 2021-01-01 RX ADMIN — PROPOFOL 15 MCG/KG/MIN: 10 INJECTION, EMULSION INTRAVENOUS at 21:40

## 2021-01-01 RX ADMIN — PIPERACILLIN SODIUM AND TAZOBACTAM SODIUM 3375 MG: 3; .375 INJECTION, POWDER, LYOPHILIZED, FOR SOLUTION INTRAVENOUS at 22:30

## 2021-01-01 RX ADMIN — FAMOTIDINE 20 MG: 10 INJECTION, SOLUTION INTRAVENOUS at 08:09

## 2021-01-01 RX ADMIN — Medication 12.5 MCG/HR: at 15:54

## 2021-01-01 RX ADMIN — PIPERACILLIN SODIUM AND TAZOBACTAM SODIUM 3375 MG: 3; .375 INJECTION, POWDER, LYOPHILIZED, FOR SOLUTION INTRAVENOUS at 16:17

## 2021-01-01 RX ADMIN — PROPOFOL 15 MCG/KG/MIN: 10 INJECTION, EMULSION INTRAVENOUS at 17:16

## 2021-01-01 RX ADMIN — HYDRALAZINE HYDROCHLORIDE 10 MG: 20 INJECTION INTRAMUSCULAR; INTRAVENOUS at 15:04

## 2021-01-01 RX ADMIN — PROPOFOL 15 MCG/KG/MIN: 10 INJECTION, EMULSION INTRAVENOUS at 06:09

## 2021-01-01 RX ADMIN — ATORVASTATIN CALCIUM 20 MG: 20 TABLET, FILM COATED ORAL at 22:40

## 2021-01-01 RX ADMIN — ATROPINE SULFATE 1 DROP: 10 SOLUTION/ DROPS OPHTHALMIC at 16:55

## 2021-01-01 RX ADMIN — PROPOFOL 30 MCG/KG/MIN: 10 INJECTION, EMULSION INTRAVENOUS at 11:29

## 2021-01-01 RX ADMIN — ATORVASTATIN CALCIUM 20 MG: 20 TABLET, FILM COATED ORAL at 20:38

## 2021-01-01 RX ADMIN — PROPOFOL 30 MCG/KG/MIN: 10 INJECTION, EMULSION INTRAVENOUS at 16:06

## 2021-01-01 RX ADMIN — ENOXAPARIN SODIUM 30 MG: 30 INJECTION SUBCUTANEOUS at 12:24

## 2021-01-01 RX ADMIN — INSULIN LISPRO 1 UNITS: 100 INJECTION, SOLUTION INTRAVENOUS; SUBCUTANEOUS at 23:12

## 2021-01-01 RX ADMIN — PAROXETINE HYDROCHLORIDE 20 MG: 20 TABLET, FILM COATED ORAL at 08:09

## 2021-01-01 RX ADMIN — SODIUM CHLORIDE, POTASSIUM CHLORIDE, SODIUM LACTATE AND CALCIUM CHLORIDE: 600; 310; 30; 20 INJECTION, SOLUTION INTRAVENOUS at 08:47

## 2021-01-01 RX ADMIN — ENOXAPARIN SODIUM 30 MG: 30 INJECTION SUBCUTANEOUS at 13:18

## 2021-01-01 RX ADMIN — HYDRALAZINE HYDROCHLORIDE 10 MG: 20 INJECTION INTRAMUSCULAR; INTRAVENOUS at 08:24

## 2021-01-01 RX ADMIN — ATORVASTATIN CALCIUM 20 MG: 20 TABLET, FILM COATED ORAL at 21:40

## 2021-01-01 RX ADMIN — PAROXETINE HYDROCHLORIDE 20 MG: 20 TABLET, FILM COATED ORAL at 08:25

## 2021-01-01 RX ADMIN — METOPROLOL TARTRATE 50 MG: 50 TABLET, FILM COATED ORAL at 12:43

## 2021-01-01 RX ADMIN — CALCIUM GLUCONATE 1000 MG: 98 INJECTION, SOLUTION INTRAVENOUS at 17:17

## 2021-01-01 RX ADMIN — PROPOFOL 15 MCG/KG/MIN: 10 INJECTION, EMULSION INTRAVENOUS at 08:32

## 2021-01-01 RX ADMIN — SODIUM CHLORIDE, POTASSIUM CHLORIDE, SODIUM LACTATE AND CALCIUM CHLORIDE: 600; 310; 30; 20 INJECTION, SOLUTION INTRAVENOUS at 17:16

## 2021-01-01 RX ADMIN — DEXTROSE MONOHYDRATE 25 G: 25 INJECTION, SOLUTION INTRAVENOUS at 17:21

## 2021-01-01 RX ADMIN — PROPOFOL 1000 MG: 10 INJECTION, EMULSION INTRAVENOUS at 16:03

## 2021-01-01 ASSESSMENT — PULMONARY FUNCTION TESTS
PIF_VALUE: 17
PIF_VALUE: 20
PIF_VALUE: 18
PIF_VALUE: 19
PIF_VALUE: 24
PIF_VALUE: 23
PIF_VALUE: 23
PIF_VALUE: 15
PIF_VALUE: 24
PIF_VALUE: 20
PIF_VALUE: 19
PIF_VALUE: 22
PIF_VALUE: 22
PIF_VALUE: 16
PIF_VALUE: 18
PIF_VALUE: 34
PIF_VALUE: 20
PIF_VALUE: 27
PIF_VALUE: 20
PIF_VALUE: 20
PIF_VALUE: 27
PIF_VALUE: 27
PIF_VALUE: 20
PIF_VALUE: 23
PIF_VALUE: 24
PIF_VALUE: 22
PIF_VALUE: 19
PIF_VALUE: 26
PIF_VALUE: 26
PIF_VALUE: 20
PIF_VALUE: 24
PIF_VALUE: 21
PIF_VALUE: 16
PIF_VALUE: 24
PIF_VALUE: 19
PIF_VALUE: 25
PIF_VALUE: 17
PIF_VALUE: 31
PIF_VALUE: 17
PIF_VALUE: 20
PIF_VALUE: 26
PIF_VALUE: 22
PIF_VALUE: 26
PIF_VALUE: 22
PIF_VALUE: 23
PIF_VALUE: 29
PIF_VALUE: 25
PIF_VALUE: 33
PIF_VALUE: 23
PIF_VALUE: 24
PIF_VALUE: 21
PIF_VALUE: 20
PIF_VALUE: 25
PIF_VALUE: 23
PIF_VALUE: 19
PIF_VALUE: 25
PIF_VALUE: 21
PIF_VALUE: 12
PIF_VALUE: 18
PIF_VALUE: 28
PIF_VALUE: 25
PIF_VALUE: 27
PIF_VALUE: 46
PIF_VALUE: 24
PIF_VALUE: 28
PIF_VALUE: 25
PIF_VALUE: 24
PIF_VALUE: 27
PIF_VALUE: 19
PIF_VALUE: 23
PIF_VALUE: 26
PIF_VALUE: 15
PIF_VALUE: 20
PIF_VALUE: 24
PIF_VALUE: 24
PIF_VALUE: 20
PIF_VALUE: 25
PIF_VALUE: 14
PIF_VALUE: 19
PIF_VALUE: 21
PIF_VALUE: 16
PIF_VALUE: 18
PIF_VALUE: 24
PIF_VALUE: 25
PIF_VALUE: 13
PIF_VALUE: 25
PIF_VALUE: 17
PIF_VALUE: 22
PIF_VALUE: 22
PIF_VALUE: 17
PIF_VALUE: 17
PIF_VALUE: 20
PIF_VALUE: 24
PIF_VALUE: 30
PIF_VALUE: 27
PIF_VALUE: 17
PIF_VALUE: 18
PIF_VALUE: 21
PIF_VALUE: 66
PIF_VALUE: 16
PIF_VALUE: 21
PIF_VALUE: 23
PIF_VALUE: 23
PIF_VALUE: 20
PIF_VALUE: 23
PIF_VALUE: 20
PIF_VALUE: 27
PIF_VALUE: 25
PIF_VALUE: 20
PIF_VALUE: 20
PIF_VALUE: 18
PIF_VALUE: 26
PIF_VALUE: 18
PIF_VALUE: 13
PIF_VALUE: 22
PIF_VALUE: 24
PIF_VALUE: 27
PIF_VALUE: 20
PIF_VALUE: 28
PIF_VALUE: 24
PIF_VALUE: 23
PIF_VALUE: 23
PIF_VALUE: 30
PIF_VALUE: 20
PIF_VALUE: 23
PIF_VALUE: 18
PIF_VALUE: 19
PIF_VALUE: 19

## 2021-03-22 ENCOUNTER — TRANSCRIBE ORDERS (OUTPATIENT)
Dept: ADMINISTRATIVE | Facility: HOSPITAL | Age: 71
End: 2021-03-22

## 2021-03-22 DIAGNOSIS — Z01.818 PREOP TESTING: Primary | ICD-10-CM

## 2021-03-23 ENCOUNTER — LAB (OUTPATIENT)
Dept: LAB | Facility: HOSPITAL | Age: 71
End: 2021-03-23

## 2021-03-23 LAB — SARS-COV-2 ORF1AB RESP QL NAA+PROBE: NOT DETECTED

## 2021-03-23 PROCEDURE — C9803 HOPD COVID-19 SPEC COLLECT: HCPCS | Performed by: PAIN MEDICINE

## 2021-03-23 PROCEDURE — U0004 COV-19 TEST NON-CDC HGH THRU: HCPCS | Performed by: PAIN MEDICINE

## 2021-05-27 PROBLEM — J96.00 ACUTE RESPIRATORY FAILURE (HCC): Status: ACTIVE | Noted: 2021-01-01

## 2021-05-27 NOTE — PROGRESS NOTES
Patient was moving toes some twitching both of them off and on.  No response to pain or following commands

## 2021-05-27 NOTE — PROGRESS NOTES
BLOOD GAS, ARTERIAL [[id number]] (Abnormal) Collected: 05/27/21 1449     Specimen: Blood gases Updated: 05/27/21 1454      pH, Arterial 7.160      pCO2, Arterial 37.0 mmHg       pO2, Arterial 142.0 mmHg       HCO3, Arterial 13.2 mmol/L       Base Excess, Arterial -14.6 mmol/L       Hemoglobin, Art, Extended 12.6 g/dL       O2 Sat, Arterial 97.6 %       Carboxyhgb, Arterial 1.6 %       Methemoglobin, Arterial 1.1 %       O2 Content, Arterial 17.5 mL/dL       O2 Therapy Unknown     Narrative:       CALL  Green Loreta Gong , Margoth Bah, RN   Margoth Bah RN, 05/27/2021 14:54, by Carlos Narvaez     Potassium, Whole Blood [[id number]] Collected: 05/27/21 1449      Updated: 05/27/21 1449      Potassium, Whole Blood 6.7     AT+ Vent VT = 500, O2 @ 100% RR = 18, PEEP 5, L rad

## 2021-05-27 NOTE — PROCEDURES
Keron Mello is a 73 y.o. female patient. Central Line    Date/Time: 5/27/2021 3:00 PM  Performed by: Vladimir Gutierrez MD  Authorized by: Vladimir Gutierrez MD   Consent: The procedure was performed in an emergent situation. Patient identity confirmed: arm band and hospital-assigned identification number  Time out: Immediately prior to procedure a \"time out\" was called to verify the correct patient, procedure, equipment, support staff and site/side marked as required.   Indications: vascular access    Anesthesia:  Local Anesthetic: lidocaine 1% without epinephrine  Preparation: skin prepped with ChloraPrep  Skin prep agent dried: skin prep agent completely dried prior to procedure  Sterile barriers: all five maximum sterile barriers used - cap, mask, sterile gown, sterile gloves, and large sterile sheet  Hand hygiene: hand hygiene performed prior to central venous catheter insertion  Location details: right subclavian  Catheter type: triple lumen  Catheter size: 7 Fr  Pre-procedure: landmarks identified  Number of attempts: 1  Successful placement: yes  Post-procedure: line sutured and dressing applied  Assessment: blood return through all ports,  free fluid flow,  placement verified by x-ray and no pneumothorax on x-ray  Patient tolerance: patient tolerated the procedure well with no immediate complications  Comments: EBL: 2 cc        Vladimir Gutierrez MD  5/27/2021

## 2021-05-27 NOTE — PROCEDURES
Jane Martinez is a 73 y.o. female patient. Intubation    Date/Time: 5/27/2021 1:41 PM  Performed by: Valorie hZang MD  Authorized by: Valorie Zhang MD   Consent: The procedure was performed in an emergent situation. Patient identity confirmed: arm band and hospital-assigned identification number  Time out: Immediately prior to procedure a \"time out\" was called to verify the correct patient, procedure, equipment, support staff and site/side marked as required.   Indications: respiratory distress and  airway protection  Intubation method: video-assisted  Preoxygenation: BVM  Sedatives: etomidate  Paralytic: vecuronium  Laryngoscope size: Mac 4  Tube size: 7.5 mm  Tube type: cuffed  Number of attempts: 1  Cords visualized: yes  Post-procedure assessment: chest rise and CO2 detector  Breath sounds: equal  Cuff inflated: yes  ETT to lip: 24 cm  Tube secured with: ETT aaron  Chest x-ray interpreted by me and radiologist.  Chest x-ray findings: endotracheal tube in appropriate position  Patient tolerance: patient tolerated the procedure well with no immediate complications        Valorie Zhang MD  5/27/2021

## 2021-05-27 NOTE — PROGRESS NOTES
Patient family at bedside. Said she been feeling bad for few days. They tried talk her into go to the doctor yesterday. One daughter lives with her. She was complaining of nausea and being tired.

## 2021-05-27 NOTE — H&P
[address] [address]    Patient: Erum Hess   : 1950   MRN: [id number]  Code Status: Full Code  PCP: RODRIGO Ortiz CNP  Date of Service: 2021    Chief Complaint:   Unresponsive    History of Present Illness:   62-year-old female with past medical history as listed below initially presented to OSH ER after being found by her daughter at 8 a.m. this morning in an unresponsive state. Last known well time was midnight although daughter does state patient has been fatigued for the past 2 days. At 8 AM this morning, daughter states she heard her mother grunting with each breath and when she went to check on her, she found her in an unresponsive state covered in vomit and her RUE contracted. She called EMS at that time. Patient ultimately transferred to this facility for escalation of care. Review of Systems:   Review of systems not obtained due to patient factors.     Past Medical History:     Past Medical History:   Diagnosis Date    Abdominal pain     Anemia     Anxiety     Chronic back pain     Depression     Diverticulitis     Hyperlipidemia     Hypertension     Hypothyroidism     Obesity     Renal insufficiency     Type 2 diabetes mellitus (HCC)        Past Surgical History:     Past Surgical History:   Procedure Laterality Date    TOTAL KNEE ARTHROPLASTY          Family History:     Family History   Problem Relation Age of Onset    Hypertension Mother     Diabetes Mother     Hypertension Father     Diabetes Father         Social History:     Social History     Socioeconomic History    Marital status: Unknown     Spouse name: None    Number of children: None    Years of education: None    Highest education level: None   Occupational History    None   Tobacco Use    Smoking status: Never Smoker    Smokeless tobacco: Never Used   Substance and Sexual Activity    Alcohol use: Never    Drug use: Never    Sexual activity: None   Other Topics Concern    None   Social History Narrative    None     Social Determinants of Health     Financial Resource Strain:     Difficulty of Paying Living Expenses:    Food Insecurity:     Worried About Running Out of Food in the Last Year:     [address] in the Last Year:    Transportation Needs:     Lack of Transportation (Medical):  Lack of Transportation (Non-Medical):    Physical Activity:     Days of Exercise per Week:     Minutes of Exercise per Session:    Stress:     Feeling of Stress :    Social Connections:     Frequency of Communication with Friends and Family:     Frequency of Social Gatherings with Friends and Family:     Attends Congregation Services:     Active Member of Clubs or Organizations:     Attends Club or Organization Meetings:     Marital Status:    Intimate Partner Violence:     Fear of Current or Ex-Partner:     Emotionally Abused:     Physically Abused:     Sexually Abused:        Prior to Admission Medications:   Medications Prior to Admission: ALPRAZolam (XANAX) 0.5 MG tablet, Take 0.5 mg by mouth 3 times daily as needed for Sleep.  furosemide (LASIX) 20 MG tablet, Take 20 mg by mouth daily  levothyroxine (SYNTHROID) 112 MCG tablet, Take 112 mcg by mouth Daily  pantoprazole (PROTONIX) 40 MG tablet, Take 40 mg by mouth daily  simvastatin (ZOCOR) 20 MG tablet, Take 20 mg by mouth nightly  spironolactone (ALDACTONE) 25 MG tablet, Take 25 mg by mouth 2 times daily  Cholecalciferol (VITAMIN D3) 1.25 MG (61210 UT) CAPS, Take 1 capsule by mouth daily  lisinopril (PRINIVIL;ZESTRIL) 20 MG tablet, Take 20 mg by mouth daily  glipiZIDE (GLUCOTROL) 5 MG tablet, Take 5 mg by mouth 2 times daily (before meals)  cloNIDine (CATAPRES) 0.1 MG/24HR PTWK, Place 1 patch onto the skin once a week     Allergies:      Allergies   Allergen Reactions    Aspirin     Biaxin [Clarithromycin]     Brethaire [Terbutaline]     Haemophilus Influenzae Vaccines     Other Gi cocktail    Pcn [Penicillins]          Physical Exam:   BP (!) 139/49   Pulse 79   Temp 97.3 °F (36.3 °C) (Temporal)   Resp 29   Ht 5' 8\" (1.727 m)   Wt 249 lb 1.9 oz (113 kg)   SpO2 96%   BMI 37.88 kg/m²     General: mild to moderate distress  HEENT: normocephalic, perrl  Neck: supple, symmetrical, trachea midline   Lungs: rhonchi  Cardiovascular: s1 and s2 normal  Abdomen: soft, positive bowel sounds  Extremities: no edema  Neuro: unresponsive, RUE contracted    Recent Results (from the past 72 hour(s))   Respiratory Panel, Molecular, with COVID-19 (Restricted: peds pts or suitable admitted adults)    Collection Time: 05/27/21  1:30 PM   Result Value Ref Range    Adenovirus by PCR Not Detected Not Detected    Bordetella parapertussis by PCR Not Detected Not Detected    Bordetella pertussis by PCR Not Detected Not Detected    Chlamydophilia pneumoniae by PCR Not Detected Not Detected    Coronavirus 229E by PCR Not Detected Not Detected    Coronavirus HKU1 by PCR Not Detected Not Detected    Coronavirus NL63 by PCR Not Detected Not Detected    Coronavirus OC43 by PCR Not Detected Not Detected    SARS-CoV-2, PCR Not Detected Not Detected    Human Metapneumovirus by PCR Not Detected Not Detected    Human Rhinovirus/Enterovirus by PCR Not Detected Not Detected    Influenza A by PCR Not Detected Not Detected    Influenza B by PCR Not Detected Not Detected    Mycoplasma pneumoniae by PCR Not Detected Not Detected    Parainfluenza Virus 1 by PCR Not Detected Not Detected    Parainfluenza Virus 2 by PCR Not Detected Not Detected    Parainfluenza Virus 3 by PCR Not Detected Not Detected    Parainfluenza Virus 4 by PCR Not Detected Not Detected    Respiratory Syncytial Virus by PCR Not Detected Not Detected   Urinalysis Reflex to Culture    Collection Time: 05/27/21  1:35 PM    Specimen: Urine, clean catch   Result Value Ref Range    Color, UA YELLOW Straw/Yellow    Clarity, UA TURBID (A) Clear    Glucose, Ur Negative Negative mg/dL    Bilirubin Urine Negative Negative    Ketones, Urine TRACE (A) Negative mg/dL    Specific Gravity, UA 1.017 1.005 - 1.030    Blood, Urine MODERATE (A) Negative    pH, UA 5.0 5.0 - 8.0    Protein, UA 30 (A) Negative mg/dL    Urobilinogen, Urine 1.0 <2.0 E.U./dL    Nitrite, Urine Negative Negative    Leukocyte Esterase, Urine SMALL (A) Negative   Microscopic Urinalysis    Collection Time: 05/27/21  1:35 PM   Result Value Ref Range    Hyaline Casts, UA 2-4 0 - 5 /LPF    WBC, UA 2-4 0 - 5 /HPF    RBC, UA 2-4 0 - 2 /HPF    Epithelial Cells, UA 3-5 /HPF    Bacteria, UA Rare (A) None Seen /HPF   BLOOD GAS, ARTERIAL    Collection Time: 05/27/21  2:49 PM   Result Value Ref Range    pH, Arterial 7.160 (LL) 7.350 - 7.450    pCO2, Arterial 37.0 35.0 - 45.0 mmHg    pO2, Arterial 142.0 (H) 80.0 - 100.0 mmHg    HCO3, Arterial 13.2 (L) 22.0 - 26.0 mmol/L    Base Excess, Arterial -14.6 (L) -2.0 - 2.0 mmol/L    Hemoglobin, Art, Extended 12.6 12.0 - 16.0 g/dL    O2 Sat, Arterial 97.6 >92 %    Carboxyhgb, Arterial 1.6 0.0 - 5.0 %    Methemoglobin, Arterial 1.1 <1.5 %    O2 Content, Arterial 17.5 Not Established mL/dL    O2 Therapy Unknown    Potassium, Whole Blood    Collection Time: 05/27/21  2:49 PM   Result Value Ref Range    Potassium, Whole Blood 6.7    CBC Auto Differential    Collection Time: 05/27/21  2:58 PM   Result Value Ref Range    WBC 34.0 (H) 4.8 - 10.8 K/uL    RBC 3.90 (L) 4.20 - 5.40 M/uL    Hemoglobin 11.9 (L) 12.0 - 16.0 g/dL    Hematocrit 37.8 37.0 - 47.0 %    MCV 96.9 81.0 - 99.0 fL    MCH 30.5 27.0 - 31.0 pg    MCHC 31.5 (L) 33.0 - 37.0 g/dL    RDW 13.2 11.5 - 14.5 %    Platelets 165 055 - 734 K/uL    MPV 10.8 9.4 - 12.3 fL    Neutrophils % 93.1 (H) 50.0 - 65.0 %    Lymphocytes % 2.6 (L) 20.0 - 40.0 %    Monocytes % 2.9 0.0 - 10.0 %    Eosinophils % 0.0 0.0 - 5.0 %    Basophils % 0.2 0.0 - 1.0 %    Neutrophils Absolute 31.7 (H) 1.5 - 7.5 K/uL    Immature Granulocytes # 0.4 K/uL Lymphocytes Absolute 0.9 (L) 1.1 - 4.5 K/uL    Monocytes Absolute 1.00 (H) 0.00 - 0.90 K/uL    Eosinophils Absolute 0.00 0.00 - 0.60 K/uL    Basophils Absolute 0.10 0.00 - 0.20 K/uL   Comprehensive Metabolic Panel    Collection Time: 05/27/21  2:58 PM   Result Value Ref Range    Sodium 136 136 - 145 mmol/L    Potassium 6.4 (HH) 3.5 - 5.0 mmol/L    Chloride 107 98 - 111 mmol/L    CO2 14 (L) 22 - 29 mmol/L    Anion Gap 15 7 - 19 mmol/L    Glucose 201 (H) 74 - 109 mg/dL    BUN 72 (H) 8 - 23 mg/dL    CREATININE 2.5 (H) 0.5 - 0.9 mg/dL    GFR Non- 19 (A) >60    GFR  23 (L) >59    Calcium 8.5 (L) 8.8 - 10.2 mg/dL    Total Protein 6.9 6.6 - 8.7 g/dL    Albumin 3.7 3.5 - 5.2 g/dL    Total Bilirubin 0.9 0.2 - 1.2 mg/dL    Alkaline Phosphatase 110 (H) 35 - 104 U/L    ALT 15 5 - 33 U/L    AST 23 5 - 32 U/L   Magnesium    Collection Time: 05/27/21  2:58 PM   Result Value Ref Range    Magnesium 1.8 1.6 - 2.4 mg/dL   Phosphorus    Collection Time: 05/27/21  2:58 PM   Result Value Ref Range    Phosphorus 4.2 2.5 - 4.5 mg/dL   Lactic Acid, Plasma    Collection Time: 05/27/21  2:58 PM   Result Value Ref Range    Lactic Acid 2.0 (HH) 0.5 - 1.9 mmol/L   Culture, Respiratory    Collection Time: 05/27/21  3:59 PM    Specimen: Sputum, Suctioned   Result Value Ref Range    Gram Stain Result       Many WBC's (Polymorphonuclear)  Rare Epithelial Cells  Few Gram positive cocci  in chains and pairs  Few Gram positive cocci  in clusters  Few Yeast         I/O this shift: In: 774 [I.V.:724; IV Piggyback:50]  Out: 350 [Urine:300; Emesis/NG output:50]    XR CHEST PORTABLE    Result Date: 5/27/2021  1. Endotracheal tube is at the durga pointed towards the RIGHT mainstem bronchus, consider retraction by 2 to 3 cm. 2.  RIGHT sided CVL tip overlies the low SVC. 3.  RIGHT lower lobe airspace opacity, with differential including pneumonia and aspiration.  Signed by Dr Brock Palomino on 5/27/2021 3:54 PM      Assessment and Plan:   Acute encephalopathy  Last known well time midnight  Daughter found patient at 8 AM unresponsive/covered in vomit/RUE contracted  Attempting to obtain imaging from OSH  MRI brain to eval for CVA  History of DM2/HLD/HTN/morbid obesity  HbA1c/FLP/thyroid panel  Neurology consulted  Neurochecks    Aspiration pneumonia versus CAP  Empiric broad-spectrum antibiotics  Follow cultures  Molecular respiratory panel negative    Sepsis  Suspect secondary to above processes  Empiric broad-spectrum antibiotics  Follow cultures  IVF  Lactate 2.0    Ventilator dependent acute respiratory failure  Secondary to above processes  Titrate minute ventilation for pH 7.35  Follow ABG/CXR    PROSPER/hyperkalemia  Unsure of baseline creatinine  Cocktail administered for hyperkalemia  Avoid offending agents  Follow renal function/urine output/electrolytes  Renal ultrasound  Renal consulted    DM2  Meds on board    Morbid obesity    DVT prophylaxis  SCDs    Extensive discussion with patient's family (2 daughters, brother) in regards to current clinical state. All questions sought and answered.     Total critical care time: 96 minutes  Total time spent managing the care of this patient: 96 minutes    Mario Michel MD  5/27/2021 7:21 PM

## 2021-05-28 PROBLEM — Z51.5 PALLIATIVE CARE PATIENT: Status: ACTIVE | Noted: 2021-01-01

## 2021-05-28 NOTE — CONSULTS
Palliative Care Consult Note  5/28/2021 7:29 AM  Subjective:   Admit Date: 5/27/2021  PCP: RODRIGO Connor - CNP    Reason For Consult: Goals of care, Code status, Family Support    Requesting Physician:  Dr. Natalie Lyles    History Obtained From: EMR, nursing, attending    Chief Complaint: Sedated, intubated,mechanically ventilated    History of Present Illness:  Patient is a 73 yr old female with a PMH o fHTN, HLD, DM II, that initially presented to OSH ER after being found down by her daughter unresponsive yesterday morning. It was reported that the patient was fatigued for a couple days prior. When patient's daughter checked on her that morning, she was noted to have grunting with breaths, unresponsive,vomited, RUE contracted. EMS was notified, she was transferred to [facility] for higher level of care. Initial workup completed, CXR revealed RLL opacity, aspiration vs. CAP. WBC 34.0, K+ 6.4, BUN 72, Cr 2.5, GFR 19, lactic acid 2.0, ph 7.16, respiratory panel negative for COVID-19, blood and urine cultures obtained. She was intubated and mechanically ventilated, initiated on IVF and empiric abx, admitted to ICU, for further evaluation and treatment. Patient remains in ICU, sedated, intubated, mechanically ventilated. Nephrology consulted and following for management of PROSPER and hyperkalemia. Neurology consulted and following, MRI and EEG pending. Due to critical nature of patient's illness, Palliative Care was consulted to assist with discussions regarding goals of care, code status discussions, pt/family support.        Past Medical History:        Diagnosis Date    Abdominal pain     Anemia     Anxiety     Chronic back pain     Depression     Diverticulitis     Hyperlipidemia     Hypertension     Hypothyroidism     Obesity     Palliative care patient 05/28/2021    Renal insufficiency     Type 2 diabetes mellitus ([facility] [address]        Past Surgical History:        Procedure Laterality Date    TOTAL KNEE ARTHROPLASTY         Allergies:  Aspirin, Biaxin [clarithromycin], Brethaire [terbutaline], Haemophilus influenzae vaccines, Other, and Pcn [penicillins]    Social History:    TOBACCO:   reports that she has never smoked. She has never used smokeless tobacco.  ETOH:   reports no history of alcohol use.     Family History:       Problem Relation Age of Onset    Hypertension Mother     Diabetes Mother     Hypertension Father     Diabetes Father        Palliative Performance Score: 10%    Review of Systems   Unable to obtain due to acuity of care, sedated and intubated      Palliative Review of Advance Directives:     Surrogate Decision Maker:no, has adult children    Durable Power of :no    Advanced Directives/Living Rahman: no    Out of hospital medical orders in place to reflect resuscitation status (MOLST/POLST): no    Information Sharing:  Patient's awareness of illness:  [] Terminal [] Life-Threatening [] Serious [] Non life-threatening [] Not serious   [x] Not discussed     Family awareness of illness:   [] Terminal [x] Life-Threatening [] Serious [] Nonlife-threatening [] Not serious   [] Not discussed    Diet NPO Effective Now    Medications:   lactated ringers 75 mL/hr at 05/28/21 0333    propofol 15 mcg/kg/min (05/28/21 0609)    dextrose       Current Facility-Administered Medications   Medication Dose Route Frequency Provider Last Rate Last Admin    piperacillin-tazobactam (ZOSYN) 3,375 mg in dextrose 5 % 50 mL IVPB (mini-bag)  3,375 mg Intravenous Q6H Maegan Ambrosio MD   Stopped at 05/28/21 0371    lactated ringers infusion   Intravenous Continuous Maegan Ambrosio MD 75 mL/hr at 05/28/21 0333 New Bag at 05/28/21 0333    insulin lispro (HUMALOG) injection vial 0-6 Units  0-6 Units Subcutaneous TID WC Maegan Ambrosio MD   2 Units at 05/27/21 1646    insulin lispro (HUMALOG) injection vial 0-3 Units  0-3 Units Subcutaneous Nightly Maegan Ambrosio MD   1 Units at 05/27/21 8957    propofol injection  5-50 mcg/kg/min Intravenous Titrated Fanny Richter MD 10.2 mL/hr at 05/28/21 0609 15 mcg/kg/min at 05/28/21 0609    glucose (GLUTOSE) 40 % oral gel 15 g  15 g Oral PRN Fanny Richter MD        dextrose 50 % IV solution  12.5 g Intravenous PRN Fanny Richter MD        glucagon (rDNA) injection 1 mg  1 mg Intramuscular PRN Fanny Richter MD        dextrose 5 % solution  100 mL/hr Intravenous PRN Fanny Richter MD        Vitamin D3 CAPS 1 capsule  1 capsule Oral Daily Fanny Richter MD        cloNIDine (CATAPRES) 0.1 MG/24HR 1 patch  1 patch Transdermal Weekly Fanny Richter MD   1 patch at 05/28/21 0135    levothyroxine (SYNTHROID) tablet 112 mcg  112 mcg Oral Daily Fanny Richter MD        atorvastatin (LIPITOR) tablet 10 mg  10 mg Oral Nightly Fanny Richter MD        [Held by provider] lisinopril (PRINIVIL;ZESTRIL) tablet 20 mg  20 mg Oral Daily Fanny Richter MD        [Held by provider] spironolactone (ALDACTONE) tablet 25 mg  25 mg Oral BID Fanny Richter MD        [Held by provider] furosemide (LASIX) tablet 20 mg  20 mg Oral Daily Fanny Richter MD        famotidine (PEPCID) injection 20 mg  20 mg Intravenous Daily Fanny Richter MD   20 mg at 05/27/21 2204        Labs:     Recent Labs     05/27/21  1458 05/28/21  0400   WBC 34.0* 24.4*   RBC 3.90* 3.47*   HGB 11.9* 10.5*   HCT 37.8 33.0*   MCV 96.9 95.1   MCH 30.5 30.3   MCHC 31.5* 31.8*    183     Recent Labs     05/27/21  1458 05/28/21  0400 05/28/21  0440    140  --    K 6.4* 5.7* 5.8   ANIONGAP 15 12  --     109  --    CO2 14* 19*  --    BUN 72* 76*  --    CREATININE 2.5* 2.5*  --    GLUCOSE 201* 184*  --    CALCIUM 8.5* 9.0  --      Recent Labs     05/27/21  1458 05/28/21  0400   MG 1.8 1.8   PHOS 4.2 3.2     Recent Labs     05/27/21  1458 05/28/21  0400   AST 23 21   ALT 15 11   BILITOT 0.9 0.6   ALKPHOS 110* 86     ABGs:  Recent Labs     05/27/21  1449 05/28/21  0440   PHART 7.160* 7.380   BVG2BLS 37.0 30.0*   PO2ART 142.0* 233.0*   WDT5QBA 13.2* 17.7*   BEART -14.6* -6.4*   HGBAE 12.6 11.1*   I9YAIHNN 97.6 96.6   CARBOXHGBART 1.6 1.8   02THERAPY Unknown  --      HgBA1c:   Recent Labs     05/28/21  0400   LABA1C 6.3*     FLP:    Lab Results   Component Value Date    TRIG 89 05/28/2021    HDL 56 05/28/2021    LDLCALC 49 05/28/2021     TSH:  No results found for: TSH  Troponin T: No results for input(s): TROPONINI in the last 72 hours. INR: No results for input(s): INR in the last 72 hours. Objective:   Vitals: BP (!) 141/55   Pulse 76   Temp 97.2 °F (36.2 °C) (Temporal)   Resp 17   Ht 5' 8\" (1.727 m)   Wt 247 lb 1.6 oz (112.1 kg)   SpO2 97%   BMI 37.57 kg/m²   24HR INTAKE/OUTPUT:      Intake/Output Summary (Last 24 hours) at 5/28/2021 6169  Last data filed at 5/28/2021 0600  Gross per 24 hour   Intake 1796.41 ml   Output 1095 ml   Net 701.41 ml     Physical Exam      General appearance: sedated, obese, no acute distress   HEENT: atraumatic, eyes with clear conjunctiva and normal lids, pupils and irises normal, external ears and nose are normal,lips normal, ETT/OG in place  Neck: without masses, supple   Lungs: no increased work of breathing, ventilating bilaterally, mild rhonchi noted  Heart: regular rate and rhythm and S1, S2 normal  Abdomen: large, soft, unable to auscultate BS  Genitourinary: No bladder fullness, masses, or tenderness  Extremities: chronic skin changes to BLE, 2+ pitting edema to BLE  Neurologic: Sedated, pupils reactive, occasional nonpurposeful movement of lower extremities  Psychiatric: sedated, no agitation or anxiety  Skin: warm, dry    Assessment and Plan: Active Problems:    Acute respiratory failure [facility])    Palliative care patient  Resolved Problems:    * No resolved hospital problems. *      Visit Summary: Chart reviewed, patient discussed with attending and nursing staff. I saw the patient at the bedside with nursing staff present.  She is sedated, remains intubated and mechanically ventilated. Noted nonpurposeful movements of lower extremities at times, pupils reactive, no other noted response. She is planned for MRI and EEG this morning. Family had been present this morning and I attempted to meet with them but they briefly stepped out while pt went for testing. I will follow up with family later this morning. Met with patient's daughters at the bedside. I reviewed Palliative Care services and reason for consult. Patient health history and events leading to current hospitalization discussed. Information from MRI discussed, family remains hopeful but understand that there may be possible neurologic deficits, will defer to neurology for further insight. Patient is having further testing today, awaiting results to help guide discussion regarding goals of care. Family would like to continue workup and aggressive care, take patient's condition and decisions \"one step at a time. \" Encouragement and emotional support provided. Palliative will continue to follow. Recommendations:     1. Palliative Care- GOC ongoing discussion, family currently continuing with aggressive measures, further discussion will be dependent on patient's progress throughout hospitalization  CODE STATUS- FULL CODE, ongoing discussion    2. Acute Encephalopathy- Neurology consulted, MRI/EEG pending, neurochecks, labs per attending    3. Sepsis/PNA- empiric abx, labs/cultures trended, IVF per attending    4. Acute respiratory failure, vent dependent- Vent management per attending    5. PROSPER/Hyperkalemia- Nephrology consulted and following for management      Thank you for consulting palliative care and allowing us to participate in the care of the patient.       CounselingTopics: Goals of care, Code Status, Disease process education, pt/family support    Time Spent Counseling > 50%:  YES                                   Total Time Spent: 73 min    Electronically signed by RODRIGO Juarez on 5/28/2021 at 7:29 AM    (Please note that portions of this note were completed with a voice recognition program.  Efforts were made to edit the dictations but occasionally words are mis-transcribed.)

## 2021-05-28 NOTE — CONSULTS
**Physician Signature**  This document was electronically signed by: Karla Hanson MD    2021 10:15 PM    **Consult Information**  Member Facility: [address] [place] [address] MRN: [id number]  Visit/Encounter Number: [id number]  Consult ID: [id number]  Facility Time Zone: CT  Date and Time of Request: 2021 09:00 PM  Requesting Clinician: Jairo Lockett MD  Patient Name: Laure Barroso  YOB: 1950  Gender: Female  Patient identity was confirmed at the beginning of the consult with the   patient/family/staff using two personal identifiers: Patient name and       **Admission**  Admission Date: 2021  Chief reason for ICU admission: Altered Mental Status    **Core Metrics**  General orienting sentence for patient: 74 yo female with MS changes PROSPER,   intubated for airway protection, on LR/abx for aspiration pna  Chief physiologic deterioration: Change in mental status  Is the patient on DVT prophylaxis?: Yes  Prophylaxis type: Mechanical, Pharmacological  Is the patient on GI prophylaxis?: No  Has this patient reached their nutritional goal?: Yes  Are there current issues with pain management in this patient?:   No  Are there issues with skin integrity?: No  Are there issues with delirium?: Yes and issues are being addressed  Has the patient been mobilized?: No  Is this patient currently intubated?: No  Are there ethical or care philosophy or family issues?: No  Do you recommend an in depth evaluation?: No  Do you recommend the patient should: : Continue ICU level of care

## 2021-05-28 NOTE — CONSULTS
**Physician Signature**  This document was electronically signed by: Flaca Escobedo MD  2021   11:11 AM    **Consult Information**  Member Facility: [address] [place] [address] MRN: [id number]  Visit/Encounter Number: [id number]  Consult ID: [id number]  Facility Time Zone: CT  Date and Time of Request: 2021 06:05 AM  Requesting Clinician: Amaya Ross MD  Patient Name: Trinity Newell  YOB: 1950  Gender: Female  Patient identity was confirmed at the beginning of the consult with the   patient/family/staff using two personal identifiers: Patient name and       **Admission**  Admission Date: 2021  Chief reason for ICU admission: Altered Mental Status    **Core Metrics**  General orienting sentence for patient: 72 yo female with MS changes PROSPER,   intubated for airway protection, on LR/abx for aspiration pna noted acute   CVA. Not alert, not following commands. Propofol re-added   because of perceived discomfort on vent.   Chief physiologic deterioration: Change in mental status  Is the patient on DVT prophylaxis?: Yes  Prophylaxis type: Mechanical, Pharmacological  Is the patient on GI prophylaxis?: No  Has this patient reached their nutritional goal?: Yes  Are there current issues with pain management in this patient?:   No  Are there issues with skin integrity?: No  Are there issues with delirium?: Yes and issues are being addressed  Has the patient been mobilized?: No  Is this patient currently intubated?: No  Are there ethical or care philosophy or family issues?: No  Do you recommend an in depth evaluation?: No  Do you recommend the patient should: : Continue ICU level of care    **Inserted/Removed Devices**  Device Name: Briones Catheter  Site of insertion: Bladder  Date of insertion: Unknown  Device Name: Endotracheal Tube  Site of insertion: Trachea  Date of insertion: Unknown

## 2021-05-28 NOTE — PROGRESS NOTES
Hospitalist Progress Note  [facility]     Patient: Misael Diego  : 1950  MRN: [id number]  Code Status: Full Code    Hospital Day: 1   Date of Service: 2021    Subjective:   Patient seen and examined. Intubated and sedated. Past Medical History:   Diagnosis Date    Abdominal pain     Anemia     Anxiety     Chronic back pain     Depression     Diverticulitis     Hyperlipidemia     Hypertension     Hypothyroidism     Obesity     Palliative care patient 2021    Renal insufficiency     Type 2 diabetes mellitus ([facility] [address]        Past Surgical History:   Procedure Laterality Date    TOTAL KNEE ARTHROPLASTY         Family History   Problem Relation Age of Onset    Hypertension Mother     Diabetes Mother     Hypertension Father     Diabetes Father        Social History     Socioeconomic History    Marital status: Unknown     Spouse name: Not on file    Number of children: Not on file    Years of education: Not on file    Highest education level: Not on file   Occupational History    Not on file   Tobacco Use    Smoking status: Never Smoker    Smokeless tobacco: Never Used   Substance and Sexual Activity    Alcohol use: Never    Drug use: Never    Sexual activity: Not on file   Other Topics Concern    Not on file   Social History Narrative    Not on file     Social Determinants of Health     Financial Resource Strain:     Difficulty of Paying Living Expenses:    Food Insecurity:     Worried About Running Out of Food in the Last Year:     Ran Out of Food in the Last Year:    Transportation Needs:     Lack of Transportation (Medical):      Lack of Transportation (Non-Medical):    Physical Activity:     Days of Exercise per Week:     Minutes of Exercise per Session:    Stress:     Feeling of Stress :    Social Connections:     Frequency of Communication with Friends and Family:     Frequency of Social Gatherings with Friends and Family:     Attends Congregation Services:     Active Member of Clubs or Organizations:     Attends Club or Organization Meetings:     Marital Status:    Intimate Partner Violence:     Fear of Current or Ex-Partner:     Emotionally Abused:     Physically Abused:     Sexually Abused:        Current Facility-Administered Medications   Medication Dose Route Frequency Provider Last Rate Last Admin    [START ON 5/29/2021] vitamin D (ERGOCALCIFEROL) capsule 50,000 Units  50,000 Units Oral Weekly Julienne Lozano MD        piperacillin-tazobactam (ZOSYN) 3,375 mg in dextrose 5 % 50 mL IVPB (mini-bag)  3,375 mg Intravenous Q6H Julienne Lozano  mL/hr at 05/28/21 1102 3,375 mg at 05/28/21 1102    lactated ringers infusion   Intravenous Continuous Julienne Lozano MD 75 mL/hr at 05/28/21 0333 New Bag at 05/28/21 0333    insulin lispro (HUMALOG) injection vial 0-6 Units  0-6 Units Subcutaneous TID WC Julienne Lozano MD   2 Units at 05/27/21 1646    insulin lispro (HUMALOG) injection vial 0-3 Units  0-3 Units Subcutaneous Nightly Julienne Lozano MD   1 Units at 05/27/21 2312    propofol injection  5-50 mcg/kg/min Intravenous Titrated Julienne Lozano MD 10.2 mL/hr at 05/28/21 0609 15 mcg/kg/min at 05/28/21 0609    glucose (GLUTOSE) 40 % oral gel 15 g  15 g Oral PRN Julienne Lozano MD        dextrose 50 % IV solution  12.5 g Intravenous PRN Julienne Lozano MD        glucagon (rDNA) injection 1 mg  1 mg Intramuscular PRN Julienne Lozano MD        dextrose 5 % solution  100 mL/hr Intravenous PRN Julienne Lozano MD        cloNIDine (CATAPRES) 0.1 MG/24HR 1 patch  1 patch Transdermal Weekly Julienne Lozano MD   1 patch at 05/28/21 0135    levothyroxine (SYNTHROID) tablet 112 mcg  112 mcg Oral Daily Julienne Lozano MD        atorvastatin (LIPITOR) tablet 10 mg  10 mg Oral Nightly Julienne Lozano MD        [Held by provider] lisinopril (PRINIVIL;ZESTRIL) tablet 20 mg  20 mg Oral Daily Julienne Lozano MD        [Held by provider] spironolactone (ALDACTONE) tablet 25 mg  25 mg Oral BID Yao St MD        [Held by provider] furosemide (LASIX) tablet 20 mg  20 mg Oral Daily Yao St MD        famotidine (PEPCID) injection 20 mg  20 mg Intravenous Daily Yao St MD   20 mg at 05/28/21 0735         lactated ringers 75 mL/hr at 05/28/21 0333    propofol 15 mcg/kg/min (05/28/21 0609)    dextrose          Objective:   BP (!) 161/66   Pulse 83   Temp 98.8 °F (37.1 °C) (Temporal)   Resp 17   Ht 5' 8\" (1.727 m)   Wt 247 lb 1.6 oz (112.1 kg)   SpO2 100%   BMI 37.57 kg/m²     General: no acute distress  HEENT: normocephalic  Neck: supple, symmetrical, trachea midline   Lungs: rhonchi  Cardiovascular: s1 and s2 normal  Abdomen: soft, positive bowel sounds  Extremities: no edema  Neuro: intubated and sedated, right-sided deficits    Recent Labs     05/27/21  1458 05/28/21  0400   WBC 34.0* 24.4*   RBC 3.90* 3.47*   HGB 11.9* 10.5*   HCT 37.8 33.0*   MCV 96.9 95.1   MCH 30.5 30.3   MCHC 31.5* 31.8*    183     Recent Labs     05/27/21  1458 05/28/21  0400 05/28/21  0440    140  --    K 6.4* 5.7* 5.8   ANIONGAP 15 12  --     109  --    CO2 14* 19*  --    BUN 72* 76*  --    CREATININE 2.5* 2.5*  --    GLUCOSE 201* 184*  --    CALCIUM 8.5* 9.0  --      Recent Labs     05/27/21  1458 05/28/21  0400   MG 1.8 1.8   PHOS 4.2 3.2     Recent Labs     05/27/21 1458 05/28/21  0400   AST 23 21   ALT 15 11   BILITOT 0.9 0.6   ALKPHOS 110* 86     No results for input(s): PH, PO2, PCO2, HCO3, BE, O2SAT in the last 72 hours. No results for input(s): TROPONINI in the last 72 hours. No results for input(s): INR in the last 72 hours.   Recent Labs     05/27/21  1458   LACTA 2.0*         Intake/Output Summary (Last 24 hours) at 5/28/2021 1112  Last data filed at 5/28/2021 1000  Gross per 24 hour   Intake 2137.21 ml   Output 1320 ml   Net 817.21 ml       US RENAL COMPLETE    Result Date: 5/28/2021  Exam: US RENAL COMPLETE - 5/28/2021 6:27 AM Indication: Acute kidney injury Comparison: None available. Findings: Exam is limited by patient body habitus and difficulties with patient positioning. The RIGHT kidney measures 9.2 cm in length and demonstrates normal cortical thickness and echogenicity. No visible shadowing calculi or hydronephrosis. The LEFT kidney was unable to be diagnostically visualized. Urinary bladder is decompressed limiting evaluation. 1.  RIGHT kidney appears unremarkable. 2.  LEFT kidney was unable to be diagnostically visualized secondary to patient body habitus and difficulties with positioning. Signed by Dr Timo Luong on 5/28/2021 8:22 AM    XR CHEST PORTABLE    Result Date: 5/28/2021  Examination. XR CHEST PORTABLE 5/28/2021 4:33 AM History: The patient on a vent. A single, frontal, portable, upright view of the chest is compared with the previous study dated 5/27/2021. The distal end of the endotracheal tube is at the level of durga and projecting into the proximal part of the right mainstem bronchus. No significant change in the previous study. A 3 to 4 cm withdrawal is recommended. There is a persistent right lower lung consolidation. A right sided PICC line is seen in place with distal end in the distal superior vena cava. Nasogastric tube is seen. The distal end is not visualized. Endotracheal tube at the level of tracheal bifurcation. No change in position since the previous study. A 3 to 4 cm withdrawal of the tube is suggested. Right lower lung consolidation. The results were called to the nurse in charge in, Lis Goldman in ICU. Signed by Dr Gonzalo Del Toro on 5/28/2021 7:30 AM    XR CHEST PORTABLE    Result Date: 5/27/2021  Exam: XR CHEST PORTABLE - 5/27/2021 2:33 PM Indication: ETT placement and RIGHT central venous line placement Comparison: None available. Findings: Endotracheal tube is at the durga pointed towards the RIGHT mainstem bronchus, consider retraction by 2 to 3 cm.  RIGHT sided CVL tip overlies the low SVC. Cardiac silhouette is mildly enlarged. RIGHT mid to lower lung zone patchy airspace opacity. LEFT basilar atelectasis. No large pleural effusion. No pneumothorax. 1.  Endotracheal tube is at the durga pointed towards the RIGHT mainstem bronchus, consider retraction by 2 to 3 cm. 2.  RIGHT sided CVL tip overlies the low SVC. 3.  RIGHT lower lobe airspace opacity, with differential including pneumonia and aspiration. Signed by Dr Bonita Ojeda on 5/27/2021 3:54 PM    MRI BRAIN WO CONTRAST    Result Date: 5/28/2021  EXAMINATION:  MRI BRAIN WO CONTRAST  5/28/2021 9:35 AM HISTORY: Clinical concerns for stroke COMPARISON: No comparison study. TECHNIQUE: Multiplanar MR imaging the brain was performed. FINDINGS: There is moderate patchy restricted diffusion signal abnormality appreciated in the posterior right temporal lobe extending superiorly along the convexity involving the right frontal lobe posteriorly compatible with acute ischemic change/infarction, right MCA vascular congestion spotting FLAIR T2 signal hyperintensities. There is no MR evidence of abnormal intra-axial or extra-axial blood products or hemorrhagic change. There is mild sulcal effacement associated with the infarction without significant mass effect. There is no midline shift. There is no hydrocephalus. No additional T1 or T2 signal abnormalities observed brain. Pituitary gland is nonenlarged. The globes and intraorbital structures are imaged symmetrically. Cervical spine imaged in part is unremarkable. There is mucosal thickening/inflammatory change in the sphenoid and ethmoid sinuses. 1. Acute right MCA distribution infarct. 2. Paranasal sinus disease.  Signed by Dr Amado Gutierrez on 5/28/2021 9:40 AM       Assessment and Plan:   Acute right MCA infarct  Per MRI 5/28/2021  Last known well time midnight 5/27/2021  Daughter found patient at 8 AM 5/27/2021 unresponsive/covered in vomit/RUE contracted  Neurology following  History of DM2/HLD/HTN/morbid obesity  HbA1c 6.3  TSH 0.44  FLP reviewed  Neurochecks  PT/OT/SLP once able  TTE  Statin  Aspirin allergy     Aspiration pneumonia versus CAP  Empiric broad-spectrum antibiotics  Follow cultures  Molecular respiratory panel negative     Sepsis  Suspect secondary to above processes  Empiric broad-spectrum antibiotics  Follow cultures  IVF  Lactate 2.0     Ventilator dependent acute respiratory failure  Secondary to above processes  Titrate minute ventilation for pH 7.35  Follow ABG/CXR     PROSPER/hyperkalemia  Unsure of baseline creatinine  Renal following  Avoid offending agents  Follow renal function/urine output/electrolytes     DM2  Meds on board     Morbid obesity     DVT prophylaxis  Lovenox     Extensive discussion with patient's family in regards to current clinical state. All questions sought and answered.     Total critical care time: 53 minutes    Cyndy Hu MD   5/28/2021 11:12 AM

## 2021-05-28 NOTE — PROGRESS NOTES
5/28/2021  4:40 AM - [place] Incoming Lab Results From [facility] Value Ref Range & Units Status Collected Lab   pH, Arterial 7.380  7.350 - 7.450 Final 05/28/2021  4:40 AM [facility]   pCO2, Arterial 30. 0Low   35.0 - 45.0 mmHg Final 05/28/2021  4:40 AM [facility]   pO2, Arterial 233.0High   80.0 - 100.0 mmHg Final 05/28/2021  4:40 AM [facility]   HCO3, Arterial 17.7Low   22.0 - 26.0 mmol/L Final 05/28/2021  4:40 AM [facility] Excess, Arterial -6.4Low   -2.0 - 2.0 mmol/L Final 05/28/2021  4:40 AM [facility]   Hemoglobin, Art, Extended 11.1Low   12.0 - 16.0 g/dL Final 05/28/2021  4:40 AM [facility]   O2 Sat, Arterial 96.6  >92 % Final 05/28/2021  4:40 AM [facility]   Carboxyhgb, Arterial 1.8  0.0 - 5.0 % Final 05/28/2021  4:40 AM [facility]        0.0-1.5   (Smokers 1.5-5.0)    Methemoglobin, Arterial 1.6  <1.5 % Final 05/28/2021  4:40 AM [facility]   O2 Content, Arterial 15.6  Not Established mL/dL Final 05/28/2021  4:40 AM [facility]     PT ON VENT VC,14,500,70,% + 7 PEEP  RIGHT RADIAL PUNCTURE AT+

## 2021-05-28 NOTE — CONSULTS
Nephrology ([facility]) Consult Note      Patient:  Dave Turk  YOB: 1950  Date of Service: 5/27/2021  MRN: [id number]   Acct: [de-identified]   Primary Care Physician: RODRIGO Miller CNP  Advance Directive: Full Code  Admit Date: 5/27/2021       Hospital Day: 0  Referring Provider: Ciera Farris MD    Patient independently seen and examined, Chart, Consults, Notes, Operative notes, Labs, Cardiology, and Radiology studies reviewed as available. Subjective:  Dave Turk is a 73 y.o. female  whom we were consulted for Hyperkalemia. No labs were immediately available and pt was in COVID-19 ruleout. Found down at home with last normal 2200 the night before. Found in vomit. Reviewed with several family, they were unaware of prior renal issues. Pt was reportedly feeling badly for several days prior but refused to go to the hospital.  Examination completed under COVID-19 precautions initially. Unable to participate in the H&P due to vent status.     Allergies:  Aspirin, Biaxin [clarithromycin], Brethaire [terbutaline], Haemophilus influenzae vaccines, Other, and Pcn [penicillins]    Medicines:  Current Facility-Administered Medications   Medication Dose Route Frequency Provider Last Rate Last Admin    piperacillin-tazobactam (ZOSYN) 3,375 mg in dextrose 5 % 50 mL IVPB (mini-bag)  3,375 mg Intravenous Q6H Ciera Farris MD   Stopped at 05/27/21 1713    lactated ringers infusion   Intravenous Continuous Ciera Farris MD 75 mL/hr at 05/27/21 1604 New Bag at 05/27/21 1604    insulin lispro (HUMALOG) injection vial 0-6 Units  0-6 Units Subcutaneous TID WC Ciera Farris MD   2 Units at 05/27/21 1646    insulin lispro (HUMALOG) injection vial 0-3 Units  0-3 Units Subcutaneous Nightly Ciera Farris MD        propofol injection  5-50 mcg/kg/min Intravenous Titrated Ciera Farris MD 10.2 mL/hr at 05/27/21 1739 15 mcg/kg/min at 05/27/21 1739    glucose (GLUTOSE) 40 % oral gel 15 g  15 g Oral PRN Mona Elliott MD        dextrose 50 % IV solution  12.5 g Intravenous PRN Mona Elliott MD        glucagon (rDNA) injection 1 mg  1 mg Intramuscular PRN Mona Elliott MD        dextrose 5 % solution  100 mL/hr Intravenous PRN Mona Elliott MD        Vitamin D3 CAPS 1 capsule  1 capsule Oral Daily Mona Elliott MD        cloNIDine (CATAPRES) 0.1 MG/24HR 1 patch  1 patch Transdermal Weekly MD Gigi Dykes ON 5/28/2021] levothyroxine (SYNTHROID) tablet 112 mcg  112 mcg Oral Daily Mona Elliott MD        atorvastatin (LIPITOR) tablet 10 mg  10 mg Oral Nightly Mona Elliott MD        [Held by provider] lisinopril (PRINIVIL;ZESTRIL) tablet 20 mg  20 mg Oral Daily Mona Elliott MD        [Held by provider] spironolactone (ALDACTONE) tablet 25 mg  25 mg Oral BID Mona Elliott MD        [Held by provider] furosemide (LASIX) tablet 20 mg  20 mg Oral Daily Mona Elliott MD        famotidine (PEPCID) injection 20 mg  20 mg Intravenous Daily Mona Elliott MD           Past Medical History:  Past Medical History:   Diagnosis Date    Abdominal pain     Anemia     Anxiety     Chronic back pain     Depression     Diverticulitis     Hyperlipidemia     Hypertension     Hypothyroidism     Obesity     Renal insufficiency     Type 2 diabetes mellitus ([facility] [address]        Past Surgical History:  Past Surgical History:   Procedure Laterality Date    TOTAL KNEE ARTHROPLASTY         Family History  Family History   Problem Relation Age of Onset    Hypertension Mother     Diabetes Mother     Hypertension Father     Diabetes Father        Social History  Social History     Socioeconomic History    Marital status: Unknown     Spouse name: Not on file    Number of children: Not on file    Years of education: Not on file    Highest education level: Not on file   Occupational History    Not on file   Tobacco Use    Smoking status: Never Smoker    Smokeless tobacco: Never Used   Substance and Sexual Activity    Alcohol use: Never    Drug use: Never    Sexual activity: Not on file   Other Topics Concern    Not on file   Social History Narrative    Not on file     Social Determinants of Health     Financial Resource Strain:     Difficulty of Paying Living Expenses:    Food Insecurity:     Worried About Running Out of Food in the Last Year:     [address] in the Last Year:    Transportation Needs:     Lack of Transportation (Medical):      Lack of Transportation (Non-Medical):    Physical Activity:     Days of Exercise per Week:     Minutes of Exercise per Session:    Stress:     Feeling of Stress :    Social Connections:     Frequency of Communication with Friends and Family:     Frequency of Social Gatherings with Friends and Family:     Attends [employer] Services:     Active Member of Clubs or Organizations:     Attends Club or Organization Meetings:     Marital Status:    Intimate Partner Violence:     Fear of Current or Ex-Partner:     Emotionally Abused:     Physically Abused:     Sexually Abused:          Review of Systems:  History obtained from unobtainable from patient due to mental status      Objective:  Patient Vitals for the past 24 hrs:   BP Temp Temp src Pulse Resp SpO2 Height Weight   05/27/21 1800 (!) 139/49   79 29 96 %     05/27/21 1753      96 %     05/27/21 1752     14      05/27/21 1700 (!) 116/18   80 26 95 %     05/27/21 1630 (!) 103/39   77 25 93 %     05/27/21 1600 (!) 70/24 97.3 °F (36.3 °C) Temporal 71 25 (!) 86 %     05/27/21 1530 (!) 128/21   75 25 (!) 87 %     05/27/21 1500 (!) 128/32   78 26 99 %     05/27/21 1430 (!) 152/34   84 21 98 %     05/27/21 1400 (!) 199/66   90 22 99 %     05/27/21 1346      98 %     05/27/21 1339 (!) 149/69   87 (!) 39 (!) 85 % 5' 8\" (1.727 m) 249 lb 1.9 oz (113 kg)       Intake/Output Summary (Last 24 hours) at 5/27/2021 2118  Last data filed at 5/27/2021 1800  Gross per 24 hour   Intake 774.01 ml   Output 350 ml   Net 424.01 ml     Exam limited in an effort to preserve PPE and performed with nursing assistance  General: vent/ill appearing  Chest:  unlabored on vent with reported coarse bs bilat  CVS: regular rate and rhythm  Abdominal: nondistended  Extremities: no cyanosis or edema  Skin: no rash noted  Psych: unable to assess due to vent  Neuro: unable to assess due to vent        Labs:  BMP:   Recent Labs     05/27/21  1449 05/27/21  1458   NA  --  136   K 6.7 6.4*   CL  --  107   CO2  --  14*   PHOS  --  4.2   BUN  --  72*   CREATININE  --  2.5*   CALCIUM  --  8.5*     CBC:   Recent Labs     05/27/21  1458   WBC 34.0*   HGB 11.9*   HCT 37.8   MCV 96.9        LIVER PROFILE:   Recent Labs     05/27/21  1458   AST 23   ALT 15   BILITOT 0.9   ALKPHOS 110*     PT/INR: No results for input(s): PROTIME, INR in the last 72 hours. APTT: No results for input(s): APTT in the last 72 hours. BNP:  No results for input(s): BNP in the last 72 hours. Ionized Calcium:No results for input(s): IONCA in the last 72 hours. Magnesium:  Recent Labs     05/27/21  1458   MG 1.8     Phosphorus:  Recent Labs     05/27/21  1458   PHOS 4.2     HgbA1C: No results for input(s): LABA1C in the last 72 hours. Hepatic:   Recent Labs     05/27/21  1458   ALKPHOS 110*   ALT 15   AST 23   PROT 6.9   BILITOT 0.9   LABALBU 3.7     Lactic Acid:   Recent Labs     05/27/21  1458   LACTA 2.0*     Troponin: No results for input(s): CKTOTAL, CKMB, TROPONINT in the last 72 hours. ABGs: No results for input(s): PH, PCO2, PO2, HCO3, O2SAT in the last 72 hours. CRP:  No results for input(s): CRP in the last 72 hours. Sed Rate:  No results for input(s): SEDRATE in the last 72 hours. Cultures:   No results for input(s): CULTURE in the last 72 hours. No results for input(s): Xavier NOGUERA in the last 72 hours.   No results for input(s): CXSURG in the last 72 hours. Radiology reports as per the Radiologist  Radiology: XR CHEST PORTABLE    Result Date: 5/27/2021  Exam: XR CHEST PORTABLE - 5/27/2021 2:33 PM Indication: ETT placement and RIGHT central venous line placement Comparison: None available. Findings: Endotracheal tube is at the durga pointed towards the RIGHT mainstem bronchus, consider retraction by 2 to 3 cm. RIGHT sided CVL tip overlies the low SVC. Cardiac silhouette is mildly enlarged. RIGHT mid to lower lung zone patchy airspace opacity. LEFT basilar atelectasis. No large pleural effusion. No pneumothorax. 1.  Endotracheal tube is at the durga pointed towards the RIGHT mainstem bronchus, consider retraction by 2 to 3 cm. 2.  RIGHT sided CVL tip overlies the low SVC. 3.  RIGHT lower lobe airspace opacity, with differential including pneumonia and aspiration. Signed by Dr Lino Gann on 5/27/2021 3:54 PM       Assessment   PROSPER  Hyperkalemia  PNA  Acute hypoxic/hypercapnic respiratory failure  Acute encephalopathy  obesity    Plan:  D/w nursing/family  Attempt to medically manage K for now  IVF  Neuro w/u ongoing  neph w/u ordered and ongoing      Thank you for the consult, we appreciate the opportunity to provide care to your patients. Feel free to contact me if I can be of any further assistance.       Stepan Romero MD  05/27/21  9:18 PM

## 2021-05-28 NOTE — FLOWSHEET NOTE
05/28/21 0900   Encounter Summary   Services provided to: Family  (daughter Gopal Vines at bedside in ICU)   Referral/Consult From: Nurse   Support System Children   Complexity of Encounter Moderate   Length of Encounter 30 minutes   Advance Care Planning Yes   Spiritual/Shinto   Type Spiritual struggle   Assessment Anxious; Hopeful   Intervention Active listening;Explored feelings, thoughts, concerns;Prayer   Outcome Expressed feelings/needs/concerns     The patient is intubated. Emotional and spiritual support provided for patient's daughter Gopal Vines at bedside. Gopal Vines is anxious to know the cause of patient's medical condition. Asked for prayer. Gopal Vines is agreeable to be primary decision maker and Eloisa Maciel to be secondary decision maker. Will continue ACP conversations with the family, working towards having LW/ACP done.    Electronically signed by Bryant Arriaza on 5/28/2021 at 9:46 AM Electronically signed by Bryant Arriaza on 5/28/2021 at 9:46 AM

## 2021-05-28 NOTE — PROGRESS NOTES
Pharmacy Renal Adjustment    Ministerio Cruz is a 73 y.o. female. Pharmacy has renally adjusted medications per protocol. Recent Labs     05/27/21  1458 05/28/21  0400   BUN 72* 76*       Recent Labs     05/27/21  1458 05/28/21  0400   CREATININE 2.5* 2.5*       Estimated Creatinine Clearance: 28 mL/min (A) (based on SCr of 2.5 mg/dL (H)). Height:   Ht Readings from Last 1 Encounters:   05/27/21 5' 8\" (1.727 m)     Weight:  Wt Readings from Last 1 Encounters:   05/28/21 247 lb 1.6 oz (112.1 kg)       Plan: Adjust the following medications based on renal function:           Lovenox to 30mg sq every 24hr.     Electronically signed by Corbin Allison, [address] [place] [address] on 5/28/2021 at 12:09 PM

## 2021-05-28 NOTE — CONSULTS
Medications:     piperacillin-tazobactam (ZOSYN) 3,375 mg in dextrose 5 % 50 mL IVPB (mini-bag), 3,375 mg, Intravenous, Q6H, Cyndy Hu MD, Stopped at 05/28/21 9910    lactated ringers infusion, , Intravenous, Continuous, Cyndy Hu MD, Last Rate: 75 mL/hr at 05/28/21 0333, New Bag at 05/28/21 0333    insulin lispro (HUMALOG) injection vial 0-6 Units, 0-6 Units, Subcutaneous, TID WC, Cyndy Hu MD, 2 Units at 05/27/21 1646    insulin lispro (HUMALOG) injection vial 0-3 Units, 0-3 Units, Subcutaneous, Nightly, Cyndy Hu MD, 1 Units at 05/27/21 2312    propofol injection, 5-50 mcg/kg/min, Intravenous, Titrated, Cyndy Hu MD, Last Rate: 10.2 mL/hr at 05/28/21 0609, 15 mcg/kg/min at 05/28/21 0609    glucose (GLUTOSE) 40 % oral gel 15 g, 15 g, Oral, PRN, Cyndy Hu MD    dextrose 50 % IV solution, 12.5 g, Intravenous, PRN, Cyndy Hu MD    glucagon (rDNA) injection 1 mg, 1 mg, Intramuscular, PRN, Cyndy Hu MD    dextrose 5 % solution, 100 mL/hr, Intravenous, PRN, Cyndy Hu MD    Vitamin D3 CAPS 1 capsule, 1 capsule, Oral, Daily, Cyndy Hu MD    cloNIDine (CATAPRES) 0.1 MG/24HR 1 patch, 1 patch, Transdermal, Weekly, Cyndy Hu MD, 1 patch at 05/28/21 0135    levothyroxine (SYNTHROID) tablet 112 mcg, 112 mcg, Oral, Daily, Cyndy Hu MD    atorvastatin (LIPITOR) tablet 10 mg, 10 mg, Oral, Nightly, Cyndy Hu MD    [Held by provider] lisinopril (PRINIVIL;ZESTRIL) tablet 20 mg, 20 mg, Oral, Daily, Cyndy Hu MD    [Held by provider] spironolactone (ALDACTONE) tablet 25 mg, 25 mg, Oral, BID, Cyndy Hu MD    [Held by provider] furosemide (LASIX) tablet 20 mg, 20 mg, Oral, Daily, Cyndy Hu MD    famotidine (PEPCID) injection 20 mg, 20 mg, Intravenous, Daily, Cyndy Hu MD, 20 mg at 05/28/21 9005  Allergies: Aspirin, Biaxin [clarithromycin], Brethaire [terbutaline], Haemophilus influenzae vaccines, Other, and Pcn [penicillins]  Social History:   TOBACCO:  reports that she has never smoked. She has never used smokeless tobacco.  ETOH:  reports no history of alcohol use. Family History:       Problem Relation Age of Onset    Hypertension Mother     Diabetes Mother     Hypertension Father     Diabetes Father      ? ? Physical Exam:    Vitals: BP (!) 141/55   Pulse 76   Temp 97.2 °F (36.2 °C) (Temporal)   Resp 17   Ht 5' 8\" (1.727 m)   Wt 247 lb 1.6 oz (112.1 kg)   SpO2 97%   BMI 37.57 kg/m²   Constitutional  well developed, well nourished. Eyes  conjunctiva normal. Pupils react to light  Ear, nose, throat -hearing intact to finger rub No scars, masses, or lesions over external nose or ears, no atrophy of tongue  Neck-symmetric, no masses noted, no jugular vein distension. Slightly stiff  Respiration- chest wall appears symmetric, good expansion,   normal effort without use of accessory muscles  Musculoskeletal  no significant wasting of muscles noted, no bony deformities  Extremities-no clubbing, cyanosis or edema  Skin  warm, dry, and intact. No rash, erythema, or pallor. Psychiatric  mood, affect, and behavior appear normal.   Neurological exam  Intubated and sedated. Not following commands  Cranial Nerve Exam   CN II- Visual fields grossly unremarkable  CN III, IV,VI-EOMI, No nystagmus, conjugate eye movements, no ptosis  CN VII-no facial assymetry but I could not get her to grimace to pain. CN VIII-Hearing intact to voice  Has a gag  Motor Exam  Normal tone in the bilateral upper and lower extremities. Sensory Exam  Unable to assess  Reflexes   Absent throughout with a right Babinski  Tremors- no tremors in hands or head noted  Gait  Not tested  Coordination  Unable to assess  ?   ?  CBC:   Recent Labs     05/27/21  1458 05/28/21  0400   WBC 34.0* 24.4*   HGB 11.9* 10.5*    183     BMP:   Recent Labs     05/27/21  1458 05/28/21  0400 05/28/21  0440    140  --    K 6.4* 5.7* 5.8    109  --    CO2 14* 19*  --    BUN 72* 76*  --    CREATININE 2.5* 2.5*  --    GLUCOSE 201* 184*  --      Hepatic:   Recent Labs     05/27/21  1458 05/28/21  0400   AST 23 21   ALT 15 11   BILITOT 0.9 0.6   ALKPHOS 110* 86     Lipids:   Recent Labs     05/28/21  0400   CHOL 123*   HDL 56*     INR: No results for input(s): INR in the last 72 hours. ?  ?  Assessment and Plan   1. Altered mental status/encephalopathy. May have aspiration pneumonia. Circumstances suspicious for unwitnessed seizure. Will review MRI when it is available. Continue propofol for now. Stat EEG ordered. No clear evidence to suggest meningitis/encephalitis although that remains in the differential.  We will await the results of the above to test and further recommendations will then be made.   ?  ?      Michael Reynoso MD  Board Certified in Neurology

## 2021-05-28 NOTE — PROGRESS NOTES
Nephrology ([address] [facility]) Consult Note      Patient:  Ministerio Cruz  YOB: 1950  Date of Service: 5/28/2021  MRN: [id number]   Acct: [de-identified]   Primary Care Physician: RODRIGO Hernandez CNP  Advance Directive: Full Code  Admit Date: 5/27/2021       Hospital Day: 1  Referring Provider: Nehal Mckinney MD    Patient independently seen and examined, Chart, Consults, Notes, Operative notes, Labs, Cardiology, and Radiology studies reviewed as available. Subjective:  Ministerio Cruz is a 73 y.o. female  whom we were consulted for Hyperkalemia. No labs were immediately available and pt was in COVID-19 ruleout. Found down at home with last normal 2200 the night before. Found in vomit. Reviewed with several family, they were unaware of prior renal issues. Pt was reportedly feeling badly for several days prior but refused to go to the hospital.  Examination completed under COVID-19 precautions initially. Unable to participate in the H&P due to vent status. Today, no overnight events. She remains on the ventilator and unable to participate in the history and physical examination. Urine output improved. Family not present.         Allergies:  Aspirin, Biaxin [clarithromycin], Brethaire [terbutaline], Haemophilus influenzae vaccines, Other, and Pcn [penicillins]    Medicines:  Current Facility-Administered Medications   Medication Dose Route Frequency Provider Last Rate Last Admin    [START ON 5/29/2021] vitamin D (ERGOCALCIFEROL) capsule 50,000 Units  50,000 Units Oral Weekly Nehal Mckinney MD        piperacillin-tazobactam (ZOSYN) 3,375 mg in dextrose 5 % 50 mL IVPB (mini-bag)  3,375 mg Intravenous Q6H Nehal Mckinney MD   Stopped at 05/28/21 1435    lactated ringers infusion   Intravenous Continuous Nehal Mckinney MD 75 mL/hr at 05/28/21 0333 New Bag at 05/28/21 0333    insulin lispro (HUMALOG) injection vial 0-6 Units  0-6 Units Subcutaneous TID JENNIFER Carrera Marla Fonseca MD   2 Units at 05/27/21 1646    insulin lispro (HUMALOG) injection vial 0-3 Units  0-3 Units Subcutaneous Nightly Mona Elliott MD   1 Units at 05/27/21 2312    propofol injection  5-50 mcg/kg/min Intravenous Titrated Mona Elliott MD 10.2 mL/hr at 05/28/21 0609 15 mcg/kg/min at 05/28/21 0609    glucose (GLUTOSE) 40 % oral gel 15 g  15 g Oral PRN Mona Elliott MD        dextrose 50 % IV solution  12.5 g Intravenous PRN Mona Elliott MD        glucagon (rDNA) injection 1 mg  1 mg Intramuscular PRN Mona Elliott MD        dextrose 5 % solution  100 mL/hr Intravenous PRN Mona Elliott MD        cloNIDine (CATAPRES) 0.1 MG/24HR 1 patch  1 patch Transdermal Weekly Moan Elliott MD   1 patch at 05/28/21 0135    levothyroxine (SYNTHROID) tablet 112 mcg  112 mcg Oral Daily Mona Elliott MD        atorvastatin (LIPITOR) tablet 10 mg  10 mg Oral Nightly Mona Elliott MD        [Held by provider] lisinopril (PRINIVIL;ZESTRIL) tablet 20 mg  20 mg Oral Daily Mona Elliott MD        [Held by provider] spironolactone (ALDACTONE) tablet 25 mg  25 mg Oral BID Mona Elliott MD        [Held by provider] furosemide (LASIX) tablet 20 mg  20 mg Oral Daily Mona Elliott MD        famotidine (PEPCID) injection 20 mg  20 mg Intravenous Daily Mona Elliott MD   20 mg at 05/28/21 2584       Past Medical History:  Past Medical History:   Diagnosis Date    Abdominal pain     Anemia     Anxiety     Chronic back pain     Depression     Diverticulitis     Hyperlipidemia     Hypertension     Hypothyroidism     Obesity     Palliative care patient 05/28/2021    Renal insufficiency     Type 2 diabetes mellitus ([facility] [address]        Past Surgical History:  Past Surgical History:   Procedure Laterality Date    TOTAL KNEE ARTHROPLASTY         Family History  Family History   Problem Relation Age of Onset    Hypertension Mother     Diabetes Mother     Hypertension Father     Diabetes Father        Social History Social History     Socioeconomic History    Marital status: Unknown     Spouse name: Not on file    Number of children: Not on file    Years of education: Not on file    Highest education level: Not on file   Occupational History    Not on file   Tobacco Use    Smoking status: Never Smoker    Smokeless tobacco: Never Used   Substance and Sexual Activity    Alcohol use: Never    Drug use: Never    Sexual activity: Not on file   Other Topics Concern    Not on file   Social History Narrative    Not on file     Social Determinants of Health     Financial Resource Strain:     Difficulty of Paying Living Expenses:    Food Insecurity:     Worried About Running Out of Food in the Last Year:     [address] in the Last Year:    Transportation Needs:     Lack of Transportation (Medical):      Lack of Transportation (Non-Medical):    Physical Activity:     Days of Exercise per Week:     Minutes of Exercise per Session:    Stress:     Feeling of Stress :    Social Connections:     Frequency of Communication with Friends and Family:     Frequency of Social Gatherings with Friends and Family:     Attends Hindu Services:     Active Member of Clubs or Organizations:     Attends Club or Organization Meetings:     Marital Status:    Intimate Partner Violence:     Fear of Current or Ex-Partner:     Emotionally Abused:     Physically Abused:     Sexually Abused:          Review of Systems:  History obtained from unobtainable from patient due to mental status      Objective:  Patient Vitals for the past 24 hrs:   BP Temp Temp src Pulse Resp SpO2 Height Weight   05/28/21 1046    84 17 100 %     05/28/21 1000 (!) 162/67   91 17 100 %     05/28/21 0902 (!) 149/63   85 18 99 %     05/28/21 0747 132/69 98.8 °F (37.1 °C) Temporal 99 20 96 %     05/28/21 0700 (!) 177/65   96 21 97 %     05/28/21 0617    76 17 97 %     05/28/21 0600 (!) 141/55   76 18 97 %  247 lb 1.6 oz (112.1 kg) 05/28/21 0500 (!) 138/52   77 17 96 %     05/28/21 0442    75 19 95 %     05/28/21 0400 (!) 129/50 97.2 °F (36.2 °C) Temporal 84 20 96 %     05/28/21 0300 (!) 151/60   85 26 96 %     05/28/21 0200 (!) 120/57   73 19 96 %     05/28/21 0100 (!) 122/57   76 20 96 %     05/28/21 0000 (!) 129/39 98.1 °F (36.7 °C) Temporal 74 20 95 %     05/27/21 2300 (!) 128/43   73 21 95 %     05/27/21 2200 (!) 124/43   73 20 96 %     05/27/21 2100 (!) 124/38   72 22 96 %     05/27/21 2000 (!) 121/39 98.2 °F (36.8 °C) Temporal 73 22 97 %     05/27/21 1900 (!) 127/36   79 28 97 %     05/27/21 1800 (!) 139/49   79 29 96 %     05/27/21 1753      96 %     05/27/21 1752     14      05/27/21 1700 (!) 116/18   80 26 95 %     05/27/21 1630 (!) 103/39   77 25 93 %     05/27/21 1600 (!) 70/24 97.3 °F (36.3 °C) Temporal 71 25 (!) 86 %     05/27/21 1530 (!) 128/21   75 25 (!) 87 %     05/27/21 1500 (!) 128/32   78 26 99 %     05/27/21 1430 (!) 152/34   84 21 98 %     05/27/21 1400 (!) 199/66   90 22 99 %     05/27/21 1346      98 %     05/27/21 1339 (!) 149/69   87 (!) 39 (!) 85 % 5' 8\" (1.727 m) 249 lb 1.9 oz (113 kg)       Intake/Output Summary (Last 24 hours) at 5/28/2021 1059  Last data filed at 5/28/2021 1000  Gross per 24 hour   Intake 2137.21 ml   Output 1320 ml   Net 817.21 ml       General: vent/ill appearing  Chest:  unlabored on vent with coarse bs bilat  CVS: regular rate and rhythm  Abdominal: nondistended, minimal bs  Extremities: no cyanosis or edema  Skin: no rash noted  Psych: unable to assess due to vent  Neuro: unable to assess due to vent        Labs:  BMP:   Recent Labs     05/27/21  1458 05/28/21  0400 05/28/21  0440    140  --    K 6.4* 5.7* 5.8    109  --    CO2 14* 19*  --    PHOS 4.2 3.2  --    BUN 72* 76*  --    CREATININE 2.5* 2.5*  --    CALCIUM 8.5* 9.0  --      CBC:   Recent Labs     05/27/21  1458 05/28/21 0400   WBC 34.0* 24.4*   HGB 11.9* 10.5*   HCT 37.8 33.0*   MCV 96.9 95.1    183     LIVER PROFILE:   Recent Labs     05/27/21  1458 05/28/21  0400   AST 23 21   ALT 15 11   BILITOT 0.9 0.6   ALKPHOS 110* 86     PT/INR: No results for input(s): PROTIME, INR in the last 72 hours. APTT: No results for input(s): APTT in the last 72 hours. BNP:  No results for input(s): BNP in the last 72 hours. Ionized Calcium:No results for input(s): IONCA in the last 72 hours. Magnesium:  Recent Labs     05/27/21  1458 05/28/21  0400   MG 1.8 1.8     Phosphorus:  Recent Labs     05/27/21  1458 05/28/21  0400   PHOS 4.2 3.2     HgbA1C:   Recent Labs     05/28/21  0400   LABA1C 6.3*     Hepatic:   Recent Labs     05/27/21  1458 05/28/21  0400   ALKPHOS 110* 86   ALT 15 11   AST 23 21   PROT 6.9 6.5*   BILITOT 0.9 0.6   LABALBU 3.7 3.2*     Lactic Acid:   Recent Labs     05/27/21  1458   LACTA 2.0*     Troponin: No results for input(s): CKTOTAL, CKMB, TROPONINT in the last 72 hours. ABGs: No results for input(s): PH, PCO2, PO2, HCO3, O2SAT in the last 72 hours. CRP:  No results for input(s): CRP in the last 72 hours. Sed Rate:  No results for input(s): SEDRATE in the last 72 hours. Cultures:   No results for input(s): CULTURE in the last 72 hours. No results for input(s): BC, [place] in the last 72 hours. No results for input(s): CXSURG in the last 72 hours. Radiology reports as per the Radiologist  Radiology: XR CHEST PORTABLE    Result Date: 5/27/2021  Exam: XR CHEST PORTABLE - 5/27/2021 2:33 PM Indication: ETT placement and RIGHT central venous line placement Comparison: None available. Findings: Endotracheal tube is at the durga pointed towards the RIGHT mainstem bronchus, consider retraction by 2 to 3 cm. RIGHT sided CVL tip overlies the low SVC. Cardiac silhouette is mildly enlarged. RIGHT mid to lower lung zone patchy airspace opacity. LEFT basilar atelectasis. No large pleural effusion.  No pneumothorax. 1.  Endotracheal tube is at the durga pointed towards the RIGHT mainstem bronchus, consider retraction by 2 to 3 cm. 2.  RIGHT sided CVL tip overlies the low SVC. 3.  RIGHT lower lobe airspace opacity, with differential including pneumonia and aspiration. Signed by Dr Oneta Hammans on 5/27/2021 3:54 PM       Assessment   PROSPER  Hyperkalemia  PNA  Acute hypoxic/hypercapnic respiratory failure  Acute encephalopathy  obesity    Plan:  D/w nursing  Continue to medically manage K for now  IVF  Neuro w/u ongoing, MRI finds reviewed  Uop/creat improved      Thank you for the consult, we appreciate the opportunity to provide care to your patients. Feel free to contact me if I can be of any further assistance.       Fiordaliza Rankin MD  05/28/21  10:59 AM

## 2021-05-28 NOTE — PROGRESS NOTES
I contacted [facility] and they faxed over an updated medication list. Medication list updated.

## 2021-05-28 NOTE — CONSULTS
Comprehensive Nutrition Assessment    Type and Reason for Visit:  Initial, Consult    Nutrition Recommendations/Plan: Nepro @ 35 mL/hr. Flush tube with 25 mL/H2O every hr. Nutrition Assessment:  Pt is mechanically ventilated and NPO. Propofol currently provides 269.28 non-protein kcals/day. Will order EN per consult. Malnutrition Assessment:  Malnutrition Status:   At risk for malnutrition (Comment)    Context:  Acute Illness     Findings of the 6 clinical characteristics of malnutrition:  Energy Intake:  Mild decrease in energy intake (Comment)  Weight Loss:  No significant weight loss     Body Fat Loss:  No significant body fat loss     Muscle Mass Loss:  No significant muscle mass loss    Fluid Accumulation:  No significant fluid accumulation     Strength:  Not Performed    Estimated Daily Nutrient Needs:  Energy (kcal):  5162-1734 kcals/day; Weight Used for Energy Requirements:  Current (11-14)     Protein (g):  127 g/PRO/day; Weight Used for Protein Requirements:  Ideal (2.0)        Fluid (ml/day):  1735-8329 mL/day; Method Used for Fluid Requirements:  1 ml/kcal       Current Nutrition Therapies:    Diet NPO Effective Now    Anthropometric Measures:  · Height: 5' 8\" (172.7 cm)  · Current Body Weight: 247 lb (112 kg)   · Ideal Body Weight: 140 lbs  · BMI: 37.6    · BMI Categories: Obese Class 2 (BMI 35.0 -39.9)       Nutrition Diagnosis:   · Inadequate oral intake related to impaired respiratory function as evidenced by intubation, NPO or clear liquid status due to medical condition    Nutrition Interventions:   Food and/or Nutrient Delivery:  Continue NPO, Start Tube Feeding   Coordination of Nutrition Care:  Continue to monitor while inpatient    Goals:  Nutritional needs will be met with EN with no s/s intolerance       Nutrition Monitoring and Evaluation:   Food/Nutrient Intake Outcomes:  Enteral Nutrition Intake/Tolerance  Physical Signs/Symptoms Outcomes:  Biochemical Data, Nausea or Vomiting, Nutrition Focused Physical Findings, Weight     Electronically signed by Nav Coles MS, RD, LD on 5/28/21 at 10:52 AM CDT    Contact: [phone]

## 2021-05-29 NOTE — PROGRESS NOTES
Comprehensive Nutrition Assessment    Type and Reason for Visit:  Reassess    Nutrition Recommendations/Plan: follow for restarting EN or alternate source of nutrition    Nutrition Assessment:  Pt remains on vent. TF has been stopped at this time d/t no bowel sounds. Propofol continues at 10.2ml/hour    Malnutrition Assessment:  Malnutrition Status: At risk for malnutrition (Comment)    Context:  Acute Illness     Findings of the 6 clinical characteristics of malnutrition:  Energy Intake:  Mild decrease in energy intake (Comment)  Weight Loss:  No significant weight loss     Body Fat Loss:  No significant body fat loss     Muscle Mass Loss:  No significant muscle mass loss    Fluid Accumulation:  No significant fluid accumulation Generalized   Strength:  Not Performed    Estimated Daily Nutrient Needs:  Energy (kcal):  4386-6391 kcals/day; Weight Used for Energy Requirements:  Current (11-14)     Protein (g):  127 g/PRO/day; Weight Used for Protein Requirements:  Ideal (2.0)        Fluid (ml/day):  2992-2004 mL/day; Method Used for Fluid Requirements:  1 ml/kcal      Nutrition Related Findings:  on vent      Wounds:  None       Current Nutrition Therapies:    DIET TUBE FEED CONTINUOUS/CYCLIC NPO; Renal Formula (Nepro);  Orogastric; Continuous; 10; 35; 24    Anthropometric Measures:  · Height: 5' 8\" (172.7 cm)  · Current Body Weight: 247 lb 1.6 oz (112.1 kg)   · Admission Body Weight: 249 lb 1.9 oz (113 kg)    · Usual Body Weight:       · Ideal Body Weight: 140 lbs; % Ideal Body Weight 176.5 %   · BMI: 37.6  · Adjusted Body Weight:  ; No Adjustment   · BMI Categories: Obese Class 2 (BMI 35.0 -39.9)       Nutrition Diagnosis:   · Inadequate protein-energy intake related to acute injury/trauma, impaired respiratory function as evidenced by NPO or clear liquid status due to medical condition, intubation      Nutrition Interventions:   Food and/or Nutrient Delivery:  Continue NPO  Nutrition Education/Counseling:  No recommendation at this time   Coordination of Nutrition Care:  Continue to monitor while inpatient    Goals:  Nutritional needs will be met with EN with no s/s intolerance       Nutrition Monitoring and Evaluation:   Behavioral-Environmental Outcomes:  None Identified   Food/Nutrient Intake Outcomes:  Enteral Nutrition Intake/Tolerance  Physical Signs/Symptoms Outcomes:  Biochemical Data, Nausea or Vomiting, Nutrition Focused Physical Findings, Weight     Discharge Planning:     Too soon to determine     Electronically signed by Facundo Contreras MS, RD, LD on 5/29/21 at 9:39 AM CDT    Contact: [phone]

## 2021-05-29 NOTE — PROGRESS NOTES
Hospitalist Progress Note  [facility]     Patient: Ming Greenfield  : 1950  MRN: [id number]  Code Status: Full Code    Hospital Day: 2   Date of Service: 2021    Subjective:   Patient seen and examined. Intubated and sedated. Past Medical History:   Diagnosis Date    Abdominal pain     Anemia     Anxiety     Chronic back pain     Depression     Diverticulitis     Hyperlipidemia     Hypertension     Hypothyroidism     Obesity     Palliative care patient 2021    Renal insufficiency     Type 2 diabetes mellitus ([address]        Past Surgical History:   Procedure Laterality Date    TOTAL KNEE ARTHROPLASTY         Family History   Problem Relation Age of Onset    Hypertension Mother     Diabetes Mother     Hypertension Father     Diabetes Father        Social History     Socioeconomic History    Marital status: Unknown     Spouse name: Not on file    Number of children: Not on file    Years of education: Not on file    Highest education level: Not on file   Occupational History    Not on file   Tobacco Use    Smoking status: Never Smoker    Smokeless tobacco: Never Used   Substance and Sexual Activity    Alcohol use: Never    Drug use: Never    Sexual activity: Not on file   Other Topics Concern    Not on file   Social History Narrative    Not on file     Social Determinants of Health     Financial Resource Strain:     Difficulty of Paying Living Expenses:    Food Insecurity:     Worried About Running Out of Food in the Last Year:     Ran Out of Food in the Last Year:    Transportation Needs:     Lack of Transportation (Medical):      Lack of Transportation (Non-Medical):    Physical Activity:     Days of Exercise per Week:     Minutes of Exercise per Session:    Stress:     Feeling of Stress :    Social Connections:     Frequency of Communication with Friends and Family:     Frequency of Social Gatherings with Friends and Family:     Attends Sikh Services:     Active Member of Clubs or Organizations:     Attends Club or Organization Meetings:     Marital Status:    Intimate Partner Violence:     Fear of Current or Ex-Partner:     Emotionally Abused:     Physically Abused:     Sexually Abused:        Current Facility-Administered Medications   Medication Dose Route Frequency Provider Last Rate Last Admin    vitamin D (ERGOCALCIFEROL) capsule 50,000 Units  50,000 Units Oral Weekly Whit Vaughn MD        [Held by provider] metoprolol succinate (TOPROL XL) extended release tablet 100 mg  100 mg Oral Daily Whit Vaughn MD        PARoxetine (PAXIL) tablet 20 mg  20 mg Oral Daily Whit Vaughn MD        atorvastatin (LIPITOR) tablet 20 mg  20 mg Oral Nightly Whit Vaughn MD   20 mg at 05/28/21 2240    enoxaparin (LOVENOX) injection 30 mg  30 mg Subcutaneous Q24H Whit Vaughn MD   30 mg at 05/28/21 1318    atropine 1 % ophthalmic solution 1 drop  1 drop Sublingual Q4H PRN Whit Vaughn MD   1 drop at 05/28/21 1655    perflutren lipid microspheres (DEFINITY) injection 1.65 mg  1.5 mL Intravenous ONCE PRN Giuliana Dean MD   1.65 mg at 05/28/21 1350    piperacillin-tazobactam (ZOSYN) 3,375 mg in dextrose 5 % 50 mL IVPB (mini-bag)  3,375 mg Intravenous Q6H Whit Vaughn MD   Stopped at 05/29/21 0517    lactated ringers infusion   Intravenous Continuous Whit Vaughn MD 75 mL/hr at 05/29/21 0847 New Bag at 05/29/21 0847    insulin lispro (HUMALOG) injection vial 0-6 Units  0-6 Units Subcutaneous TID WC Whit Vaughn MD   2 Units at 05/27/21 1646    insulin lispro (HUMALOG) injection vial 0-3 Units  0-3 Units Subcutaneous Nightly Whit Vaughn MD   1 Units at 05/27/21 2312    propofol injection  5-50 mcg/kg/min Intravenous Titrated Whit Vaughn MD 10.2 mL/hr at 05/29/21 0447 15 mcg/kg/min at 05/29/21 0447    glucose (GLUTOSE) 40 % oral gel 15 g  15 g Oral PRN Whit Vaughn MD        dextrose 50 % IV solution  12.5 g Intravenous PRN Julienne Lozano MD        glucagon (rDNA) injection 1 mg  1 mg Intramuscular PRN Julienne Lozano MD        dextrose 5 % solution  100 mL/hr Intravenous PRN Julienne Lozano MD        [Held by provider] cloNIDine (CATAPRES) 0.1 MG/24HR 1 patch  1 patch Transdermal Weekly Julienne Lozano MD   1 patch at 05/28/21 0135    levothyroxine (SYNTHROID) tablet 112 mcg  112 mcg Oral Daily Julienne Lozano MD        [Held by provider] lisinopril (PRINIVIL;ZESTRIL) tablet 20 mg  20 mg Oral Daily Julienne Lozano MD        [Held by provider] spironolactone (ALDACTONE) tablet 25 mg  25 mg Oral BID Julienne Lozano MD        [Held by provider] furosemide (LASIX) tablet 20 mg  20 mg Oral Daily Julienne Lozano MD        famotidine (PEPCID) injection 20 mg  20 mg Intravenous Daily Julienne Lozano MD   20 mg at 05/29/21 0710         lactated ringers 75 mL/hr at 05/29/21 0847    propofol 15 mcg/kg/min (05/29/21 0447)    dextrose          Objective:   BP (!) 181/67   Pulse 70   Temp 97 °F (36.1 °C) (Temporal)   Resp 15   Ht 5' 8\" (1.727 m)   Wt 247 lb 1.6 oz (112.1 kg)   SpO2 100%   BMI 37.57 kg/m²     General: no acute distress  HEENT: normocephalic, perrl  Neck: supple, symmetrical, trachea midline   Lungs: rhonchi  Cardiovascular: s1 and s2 normal  Abdomen: soft, positive bowel sounds  Extremities: no edema  Neuro: intubated and sedated, rue deficit    Recent Labs     05/27/21  1458 05/28/21  0400 05/29/21  0535   WBC 34.0* 24.4* 19.4*   RBC 3.90* 3.47* 3.29*   HGB 11.9* 10.5* 10.1*   HCT 37.8 33.0* 31.4*   MCV 96.9 95.1 95.4   MCH 30.5 30.3 30.7   MCHC 31.5* 31.8* 32.2*    183 151     Recent Labs     05/27/21  1458 05/28/21  0400 05/28/21  0440 05/29/21  0535    140  --  141   K 6.4* 5.7* 5.8 4.8   ANIONGAP 15 12  --  10    109  --  109   CO2 14* 19*  --  22   BUN 72* 76*  --  61*   CREATININE 2.5* 2.5*  --  2.0*   GLUCOSE 201* 184*  --  150*   CALCIUM 8.5* 9.0  --  9.2     Recent Labs 05/27/21  1458 05/28/21  0400 05/29/21  0535   MG 1.8 1.8 1.9   PHOS 4.2 3.2  --      Recent Labs     05/27/21  1458 05/28/21  0400   AST 23 21   ALT 15 11   BILITOT 0.9 0.6   ALKPHOS 110* 86     No results for input(s): PH, PO2, PCO2, HCO3, BE, O2SAT in the last 72 hours. No results for input(s): TROPONINI in the last 72 hours. No results for input(s): INR in the last 72 hours. Recent Labs     05/27/21  1458   LACTA 2.0*         Intake/Output Summary (Last 24 hours) at 5/29/2021 1122  Last data filed at 5/29/2021 1000  Gross per 24 hour   Intake 2294.8 ml   Output 1970 ml   Net 324.8 ml       ECHO Complete 2D W Doppler W Color    Result Date: 5/28/2021  Transthoracic Echocardiography Report (TTE)  Demographics   Patient Name  Eliseo Nagy Date of Study         05/28/2021   MRN           [id number]           Gender                Female   Date of Birth 1950       Room Number           [id number]   Age           73 year(s)   Height:       68 inches        Referring Physician   Rudy Engel   Weight:       247.01 pounds    Sonographer           Yolis Drew [facility]   BSA:          2.24 m^2         Interpreting          Citlali Hernandez MD                                 Physician   BMI:          37.56 kg/m^2  Procedure Type of Study   TTE procedure:ECHO 2D W/DOPPLER/COLOR/CONTRAST. Study Location: Echo Lab Technical Quality: Poor visualization due to patient on ventilator. Patient Status: Inpatient Contrast Medium: Definity. Indications:CVA. Conclusions   Summary  Structurally normal mitral valve with normal leaflet mobility. No evidence  of mitral valve stenosis or mild mitral regurgitation. Aortic valve appears to be tricuspid. Structurally normal aortic valve. No significant aortic regurgitation or stenosis is noted. Tricuspid valve is structurally normal.  No evidence of tricuspid regurgitation. Normal size left atrium.   Normal left ventricular size with preserved LV function and an estimated ejection fraction of approximately 55-60%. No evidence of left ventricular mass or thrombus noted. Normal right atrial dimension with no evidence of thrombus or mass noted. Normal right ventricular size with preserved RV function. Signature   ----------------------------------------------------------------  Electronically signed by Mar Parsons MD(Interpreting  physician) on 05/28/2021 05:00 PM  ----------------------------------------------------------------   Findings   Mitral Valve  Structurally normal mitral valve with normal leaflet mobility. No evidence  of mitral valve stenosis or mild mitral regurgitation. Aortic Valve  Aortic valve appears to be tricuspid. Structurally normal aortic valve. No significant aortic regurgitation or stenosis is noted. Tricuspid Valve  Tricuspid valve is structurally normal.  No evidence of tricuspid regurgitation. Left Atrium  Normal size left atrium. Left Ventricle  Normal left ventricular size with preserved LV function and an estimated  ejection fraction of approximately 55-60%. No evidence of left ventricular mass or thrombus noted. Right Atrium  Normal right atrial dimension with no evidence of thrombus or mass noted. Right Ventricle  Normal right ventricular size with preserved RV function. M-Mode Measurements (cm)   % Ejection Fraction: 60 %      XR ABDOMEN (KUB) (SINGLE AP VIEW)    Result Date: 5/28/2021  Exam: XR ABDOMEN (KUB) (SINGLE AP VIEW) - 5/28/2021 3:29 PM Indication: Evaluate for acute process, abdominal pain Comparison: None available. Findings: The bowel gas pattern is nonobstructive in nature. No mass effect or suspicious calcifications. Surgical clips in the RIGHT upper abdomen are present. Enteric tube tip is in the distal stomach. No suspicious osseous lesion. Impression: 1. Nonobstructive bowel gas pattern. 2.  Enteric tube is in the distal stomach.  Signed by Dr Stephanie Katz on 5/28/2021 4:45 PM    US RENAL COMPLETE Result Date: 5/28/2021  Exam: US RENAL COMPLETE - 5/28/2021 6:27 AM Indication: Acute kidney injury Comparison: None available. Findings: Exam is limited by patient body habitus and difficulties with patient positioning. The RIGHT kidney measures 9.2 cm in length and demonstrates normal cortical thickness and echogenicity. No visible shadowing calculi or hydronephrosis. The LEFT kidney was unable to be diagnostically visualized. Urinary bladder is decompressed limiting evaluation. 1.  RIGHT kidney appears unremarkable. 2.  LEFT kidney was unable to be diagnostically visualized secondary to patient body habitus and difficulties with positioning. Signed by Dr John Nance on 5/28/2021 8:22 AM    XR CHEST PORTABLE    Result Date: 5/28/2021  Examination. XR CHEST PORTABLE 5/28/2021 4:33 AM History: The patient on a vent. A single, frontal, portable, upright view of the chest is compared with the previous study dated 5/27/2021. The distal end of the endotracheal tube is at the level of udrga and projecting into the proximal part of the right mainstem bronchus. No significant change in the previous study. A 3 to 4 cm withdrawal is recommended. There is a persistent right lower lung consolidation. A right sided PICC line is seen in place with distal end in the distal superior vena cava. Nasogastric tube is seen. The distal end is not visualized. Endotracheal tube at the level of tracheal bifurcation. No change in position since the previous study. A 3 to 4 cm withdrawal of the tube is suggested. Right lower lung consolidation. The results were called to the nurse in charge in, Deatra Hamman, in ICU. Signed by Dr Jace Hughes on 5/28/2021 7:30 AM    XR CHEST PORTABLE    Result Date: 5/27/2021  Exam: XR CHEST PORTABLE - 5/27/2021 2:33 PM Indication: ETT placement and RIGHT central venous line placement Comparison: None available.  Findings: Endotracheal tube is at the durga pointed towards the RIGHT mainstem bronchus, consider retraction by 2 to 3 cm. RIGHT sided CVL tip overlies the low SVC. Cardiac silhouette is mildly enlarged. RIGHT mid to lower lung zone patchy airspace opacity. LEFT basilar atelectasis. No large pleural effusion. No pneumothorax. 1.  Endotracheal tube is at the durga pointed towards the RIGHT mainstem bronchus, consider retraction by 2 to 3 cm. 2.  RIGHT sided CVL tip overlies the low SVC. 3.  RIGHT lower lobe airspace opacity, with differential including pneumonia and aspiration. Signed by Dr Eleonora Bolton on 5/27/2021 3:54 PM    VL DUP CAROTID BILATERAL    Result Date: 5/29/2021  Vascular Carotid Procedure  Demographics   Patient Name    Azalea Paulino Age                   73   Patient Number  [id number]           Gender                Female   Visit Number    [id number]        Interpreting          Kiara Craig MD                                   Physician   Date of Birth   1950       Referring Physician   Luisa Delong   Accession       [id number]       [address] [facility]  Number  Procedure Type of Study:   Cerebral:Carotid, VL CAROTID BILATERAL. Indications for Study:CVA. Risk Factors   - The patient's risk factor(s) include: diabetes mellitus and obesity. Impression   There is smooth plaque visualized in the bilateral internal carotid  artery(ies). There is less than 50% stenosis in the right internal carotid artery. There is less than 50% stenosis of the left internal carotid artery. There is normal antegrade flow in the bilateral vertebral artery(ies). Signature   ----------------------------------------------------------------  Electronically signed by Kiara Craig MD(Interpreting  physician) on 05/29/2021 08:05 AM  ----------------------------------------------------------------  Blood Pressure:Left arm 190/70 mmHg.  Velocities are measured in cm/s ; Diameters are measured in mm Carotid Right Measurements +------------+-------+-------+--------+-------+------------+---------------+ ! Location    ! PSV    ! EDV    ! Angle   ! RI     !%Stenosis   ! Tortuosity     ! +------------+-------+-------+--------+-------+------------+---------------+ ! Prox CCA    !101    !17.7   !        !0.82   !            !               ! +------------+-------+-------+--------+-------+------------+---------------+ ! Mid CCA     !94.1   !21.2   !        !0.77   !            !               ! +------------+-------+-------+--------+-------+------------+---------------+ ! Prox ICA    !65.2   !18.4   !        !0.72   !            !               ! +------------+-------+-------+--------+-------+------------+---------------+ ! Mid ICA     !63.7   !23.4   !        !0.63   !            !               ! +------------+-------+-------+--------+-------+------------+---------------+ ! Dist ICA    ! 84.9   !33.3   !        !0.61   !            !               ! +------------+-------+-------+--------+-------+------------+---------------+ ! Prox ECA    ! 156    !26.1   !        !0.83   !            !               ! +------------+-------+-------+--------+-------+------------+---------------+ ! Vertebral   !66.4   !19.9   !        !0.7    ! !               ! +------------+-------+-------+--------+-------+------------+---------------+   - There is antegrade vertebral flow noted on the right side. - Additional Measurements:ICAPSV/CCAPSV 0.84. ICAEDV/CCAEDV 1.88. Carotid Left Measurements +------------+-------+-------+--------+-------+------------+---------------+ ! Location    ! PSV    ! EDV    ! Angle   ! RI     !%Stenosis   ! Tortuosity     ! +------------+-------+-------+--------+-------+------------+---------------+ ! Prox CCA    !103    !32.5   !        !0.68   !            !               ! +------------+-------+-------+--------+-------+------------+---------------+ ! Mid CCA     !105    !28.3   !        !0.73   !            !               ! +------------+-------+-------+--------+-------+------------+---------------+ ! Prox ICA    ! 89.9   !24.8   !        !0.72   !            !               ! +------------+-------+-------+--------+-------+------------+---------------+ ! Mid ICA     !88.8   !34.4   !        !0.61   !            !               ! +------------+-------+-------+--------+-------+------------+---------------+ ! Dist ICA    ! 74.7   !29     !        !0.61   !            !               ! +------------+-------+-------+--------+-------+------------+---------------+ ! Prox ECA    !117    !11.4   !        !0.9    ! !               ! +------------+-------+-------+--------+-------+------------+---------------+ ! Vertebral   !67.2   !22.6   !        !0.66   !            !               ! +------------+-------+-------+--------+-------+------------+---------------+   - There is antegrade vertebral flow noted on the left side. - Additional Measurements:ICAPSV/CCAPSV 0.87. ICAEDV/CCAEDV 1.06. MRI BRAIN WO CONTRAST    Result Date: 5/28/2021  EXAMINATION:  MRI BRAIN WO CONTRAST  5/28/2021 9:35 AM HISTORY: Clinical concerns for stroke COMPARISON: No comparison study. TECHNIQUE: Multiplanar MR imaging the brain was performed. FINDINGS: There is moderate patchy restricted diffusion signal abnormality appreciated in the posterior right temporal lobe extending superiorly along the convexity involving the right frontal lobe posteriorly compatible with acute ischemic change/infarction, right MCA vascular congestion spotting FLAIR T2 signal hyperintensities. There is no MR evidence of abnormal intra-axial or extra-axial blood products or hemorrhagic change. There is mild sulcal effacement associated with the infarction without significant mass effect. There is no midline shift. There is no hydrocephalus. No additional T1 or T2 signal abnormalities observed brain. Pituitary gland is nonenlarged.  The globes and intraorbital structures are imaged symmetrically. Cervical spine imaged in part is unremarkable. There is mucosal thickening/inflammatory change in the sphenoid and ethmoid sinuses. 1. Acute right MCA distribution infarct. 2. Paranasal sinus disease. Signed by Dr Sabas Gilbert on 5/28/2021 9:40 AM       Assessment and Plan:   Acute right MCA infarct  Per MRI 5/28/2021  Last known well time midnight 5/27/2021  Daughter found patient at 8 AM 5/27/2021 unresponsive/covered in vomit/RUE contracted  Neurology following  History of DM2/HLD/HTN/morbid obesity  HbA1c 6.3  TSH 0.44  FLP reviewed  TTE reviewed  Neurochecks  PT/OT/SLP once able  Statin  Aspirin allergy     Aspiration pneumonia versus CAP  Empiric broad-spectrum antibiotics  Follow cultures  Molecular respiratory panel negative     Sepsis  Suspect secondary to above processes  Empiric broad-spectrum antibiotics  Follow cultures  IVF  Lactate 2.0     Ventilator dependent acute respiratory failure  Secondary to above processes  Titrate minute ventilation for pH 7.35  Follow ABG/CXR     PROSPER/hyperkalemia  Unsure of baseline creatinine, improving  Renal following  Avoid offending agents  Follow renal function/urine output/electrolytes     DM2  Meds on board     Morbid obesity     DVT prophylaxis  Lovenox     Extensive discussion with patient's family in regards to current clinical state.  All questions sought and answered.     Total critical care time: 44 minutes    Fanny Richter MD   5/29/2021 11:22 AM

## 2021-05-29 NOTE — PROGRESS NOTES
[address] [facility]  [address] [place] [address] Drive, [address]  [facility], [facility]  Phone [phone]     Neurology Progress Note  2021 11:30 AM  Information:   Patient Name: Hali Loaiza  :   1950  Age:   73 y.o. MRN:   [id number]  Account #:  [de-identified]  Admit Date:   2021  Today:  21     ADMIT DX:   Acute respiratory failure    Subjective:     Carmenzamatthewchester Mccartney Alanis [facility] is a 72 YO woman with hypertension, hyperlipidemia, and diabetes who was found unresponsive at home . She was noted to have no left limb movement and a right gaze preference. Interval History:   She remains intubated and sedated in the ICU. She will open her eyes and  with the right hand.       Objective:     Past Medical History:  Past Medical History:   Diagnosis Date    Abdominal pain     Anemia     Anxiety     Chronic back pain     Depression     Diverticulitis     Hyperlipidemia     Hypertension     Hypothyroidism     Obesity     Palliative care patient 2021    Renal insufficiency     [facility]        Past Surgical History:   Procedure Laterality Date    TOTAL KNEE ARTHROPLASTY         Family History   Problem Relation Age of Onset    Hypertension Mother     Diabetes Mother     Hypertension Father     Diabetes Father        Social History     Socioeconomic History    Marital status: Unknown     Spouse name: Not on file    Number of children: Not on file    Years of education: Not on file    Highest education level: Not on file   Occupational History    Not on file   Tobacco Use    Smoking status: Never Smoker    Smokeless tobacco: Never Used   Substance and Sexual Activity    Alcohol use: Never    Drug use: Never    Sexual activity: Not on file   Other Topics Concern    Not on file   Social History Narrative    Not on file     Social Determinants of Health     Financial Resource Strain:     Difficulty of Paying Living Expenses:    Food Insecurity:     Worried About Running Out of Food in the Last Year:    [address] in the Last Year:    Transportation Needs:     Lack of Transportation (Medical):      Lack of Transportation (Non-Medical):    Physical Activity:     Days of Exercise per Week:     Minutes of Exercise per Session:    Stress:     Feeling of Stress :    Social Connections:     Frequency of Communication with Friends and Family:     Frequency of Social Gatherings with Friends and Family:     Attends Protestant Services:     Active Member of Clubs or Organizations:     Attends Club or Organization Meetings:     Marital Status:    Intimate Partner Violence:     Fear of Current or Ex-Partner:     Emotionally Abused:     Physically Abused:     Sexually Abused:        Medications:   lactated ringers 75 mL/hr at 05/29/21 0847    propofol 15 mcg/kg/min (05/29/21 0447)    dextrose        vitamin D  50,000 Units Oral Weekly    [Held by provider] metoprolol succinate  100 mg Oral Daily    PARoxetine  20 mg Oral Daily    atorvastatin  20 mg Oral Nightly    enoxaparin  30 mg Subcutaneous Q24H    piperacillin-tazobactam  3,375 mg Intravenous Q6H    insulin lispro  0-6 Units Subcutaneous TID WC    insulin lispro  0-3 Units Subcutaneous Nightly    [Held by provider] cloNIDine  1 patch Transdermal Weekly    levothyroxine  112 mcg Oral Daily    [Held by provider] lisinopril  20 mg Oral Daily    [Held by provider] spironolactone  25 mg Oral BID    [Held by provider] furosemide  20 mg Oral Daily    famotidine (PEPCID) injection  20 mg Intravenous Daily       Diagnostic Studies:  Reviewed all labs/diagnositcs since last 24hrs:  Recent Results (from the past 24 hour(s))   POCT Glucose    Collection Time: 05/28/21  1:03 PM   Result Value Ref Range    POC Glucose 149 (H) 70 - 99 mg/dl    Performed on AccuChek    POCT Glucose    Collection Time: 05/28/21  4:14 PM   Result Value Ref Range    POC Glucose 151 (H) 70 - 99 mg/dl    Performed on AccuChek POCT Glucose    Collection Time: 05/28/21  8:21 PM   Result Value Ref Range    POC Glucose 114 (H) 70 - 99 mg/dl    Performed on AccuChek    Basic Metabolic Panel    Collection Time: 05/29/21  5:35 AM   Result Value Ref Range    Sodium 141 136 - 145 mmol/L    Potassium 4.8 3.5 - 5.0 mmol/L    Chloride 109 98 - 111 mmol/L    CO2 22 22 - 29 mmol/L    Anion Gap 10 7 - 19 mmol/L    Glucose 150 (H) 74 - 109 mg/dL    BUN 61 (H) 8 - 23 mg/dL    CREATININE 2.0 (H) 0.5 - 0.9 mg/dL    GFR Non-African American 25 (A) >60    GFR  30 (L) >59    Calcium 9.2 8.8 - 10.2 mg/dL   CBC Auto Differential    Collection Time: 05/29/21  5:35 AM   Result Value Ref Range    WBC 19.4 (H) 4.8 - 10.8 K/uL    RBC 3.29 (L) 4.20 - 5.40 M/uL    Hemoglobin 10.1 (L) 12.0 - 16.0 g/dL    Hematocrit 31.4 (L) 37.0 - 47.0 %    MCV 95.4 81.0 - 99.0 fL    MCH 30.7 27.0 - 31.0 pg    MCHC 32.2 (L) 33.0 - 37.0 g/dL    RDW 13.1 11.5 - 14.5 %    Platelets 543 331 - 653 K/uL    MPV 9.9 9.4 - 12.3 fL    Neutrophils % 89.2 (H) 50.0 - 65.0 %    Lymphocytes % 5.4 (L) 20.0 - 40.0 %    Monocytes % 4.3 0.0 - 10.0 %    Eosinophils % 0.0 0.0 - 5.0 %    Basophils % 0.2 0.0 - 1.0 %    Neutrophils Absolute 17.4 (H) 1.5 - 7.5 K/uL    Immature Granulocytes # 0.2 K/uL    Lymphocytes Absolute 1.0 (L) 1.1 - 4.5 K/uL    Monocytes Absolute 0.80 0.00 - 0.90 K/uL    Eosinophils Absolute 0.00 0.00 - 0.60 K/uL    Basophils Absolute 0.00 0.00 - 0.20 K/uL   Magnesium    Collection Time: 05/29/21  5:35 AM   Result Value Ref Range    Magnesium 1.9 1.6 - 2.4 mg/dL   POCT Glucose    Collection Time: 05/29/21  7:03 AM   Result Value Ref Range    POC Glucose 123 (H) 70 - 99 mg/dl    Performed on AccuChek      Narrative   EXAMINATION: [facility]  5/28/2021 9:35 AM   HISTORY: Clinical concerns for stroke   COMPARISON: No comparison study. TECHNIQUE: Multiplanar MR imaging the brain was performed.    FINDINGS:    There is moderate patchy restricted diffusion signal abnormality   appreciated in the posterior right temporal lobe extending superiorly   along the convexity involving the right frontal lobe posteriorly   compatible with acute ischemic change/infarction, right MCA vascular   congestion spotting FLAIR T2 signal hyperintensities. There is no MR evidence of abnormal intra-axial or extra-axial blood   products or hemorrhagic change. There is mild sulcal effacement associated with the infarction without   significant mass effect. There is no midline shift. There is no hydrocephalus. No additional T1 or T2 signal abnormalities observed brain. Pituitary gland is nonenlarged. The globes and intraorbital structures are imaged symmetrically. Cervical spine imaged in part is unremarkable. There is mucosal thickening/inflammatory change in the sphenoid and   ethmoid sinuses.       Impression   1. Acute right MCA distribution infarct. 2. Paranasal sinus disease.    Signed by Dr Quentin Holt on 5/28/2021 9:40 AM     Narrative   Vascular Carotid Procedure        Demographics        Patient Name   Richa Gentile Age                   73        Patient Number  [id number]           Gender                Female        Visit Number    [id number]        Interpreting          Moustapha North MD                                     Physician        Date of Birth   1950       Referring Physician   Lucrecia Cheek        Accession       [id number]       Sonographer           Kassie Smith RVT    Number       Procedure   Type of Study:        Cerebral:Carotid, VL CAROTID BILATERAL.       Indications for Study:CVA.       Risk Factors         - The patient's risk factor(s) include: diabetes mellitus and obesity.        Impression        There is smooth plaque visualized in the bilateral internal carotid    artery(ies).    There is less than 50% stenosis in the right internal carotid artery.    There is less than 50% stenosis of the left internal carotid artery.   Donneta Roller is normal antegrade flow in the bilateral vertebral artery(ies).        Signature        ----------------------------------------------------------------    Electronically signed by Marco Antonio Olivares MD(Interpreting    [email or web address]) on 05/29/2021 08:05 AM     Narrative & Impression  Transthoracic Echocardiography Report (TTE)      Demographics      Patient Name  Andrzej Levy Date of Study         05/28/2021      MRN           [id number]           Gender                Female      Date of Birth 1950       Room Number           RKF-9555      Age           73 year(s)      Height:       68 inches        Referring Physician   Ingrid Velasquez      Weight:       247.01 pounds    Sonographer           Preston Montez RDCS      BSA:          2.24 m^2         Interpreting          Alix Mancera MD                                  Physician      BMI:          37.56 kg/m^2     Procedure     Type of Study      TTE procedure:ECHO 2D W/DOPPLER/COLOR/CONTRAST. Study Location: Echo Lab  Technical Quality: Poor visualization due to patient on ventilator.     Patient Status: Inpatient     Contrast Medium: Definity.     Indications:CVA.      Conclusions      Summary   Structurally normal mitral valve with normal leaflet mobility. No evidence   of mitral valve stenosis or mild mitral regurgitation. Aortic valve appears to be tricuspid. Structurally normal aortic valve. No significant aortic regurgitation or stenosis is noted. Tricuspid valve is structurally normal.   No evidence of tricuspid regurgitation. Normal size left atrium. Normal left ventricular size with preserved LV function and an estimated   ejection fraction of approximately 55-60%. No evidence of left ventricular mass or thrombus noted. Normal right atrial dimension with no evidence of thrombus or mass noted. Normal right ventricular size with preserved RV function.       Signature      ---------------------------------------------------------------- Electronically signed by Ozzy Pacheco MD(Interpreting   physician) on 05/28/2021 05:00 PM    EEG - slow without epileptiform abnormalities. Diet:  DIET TUBE FEED CONTINUOUS/CYCLIC NPO; Renal Formula (Nepro); Orogastric; Continuous; 10; 35; 24    Examination:  Vitals: BP (!) 181/67   Pulse 70   Temp 97 °F (36.1 °C) (Temporal)   Resp 15   Ht 5' 8\" (1.727 m)   Wt 247 lb 1.6 oz (112.1 kg)   SpO2 100%   BMI 37.57 kg/m²      Intake/Output Summary (Last 24 hours) at 5/29/2021 1130  Last data filed at 5/29/2021 1000  Gross per 24 hour   Intake 2294.8 ml   Output 1970 ml   Net 324.8 ml     General appearance:  She is an obese woman who is sedated and intubated in the ICU. HEENT: Exam; heent: Head: Normal, normocephalic, atraumatic. Lungs: clear to auscultation bilaterally  Heart: regular rate and rhythm, S1, S2 normal, no murmur, click, rub or gallop  Abdomen: soft, non-tender; bowel sounds normal; no masses,  no organomegaly  Extremities: extremities normal, atraumatic, no cyanosis or edema  Neurologic:   No response to voice. She wakens to light stimuli and opens her eyes. She has a right gaze preference. Pupils are 5mm, round, symmetrical, and sluggishly responsive to light. She does not blink to threat. She does squeeze with right hand when asked. She does not move the left arm or either leg. She follows no other commands and does not communicate otherwise. Assessment:   1. Right cerebral infarct  2. Acute respiratory failure, aspiration pneumonia RLL  3. Acute on chronic kidney injury  4. Hypertension, permissible for now  5. Diabetes    Plan:   1. Wean sedation and vent as able  2. Abx  3. PT/OT/ST when appropriate     Discharge planning: To be determined    Reviewed treatment plans with the patient and/or family. 35 minutes spent in face to face interaction and coordination of care.      Electronically signed by Bren Portillo MD on 5/29/2021 at 11:30 AM

## 2021-05-29 NOTE — CONSULTS
**Physician Signature**  This document was electronically signed by: Nikolay Canales MD  2021   10:21 PM    **Consult Information**  Member Facility: [address] [place] [address] MRN: [id number]  Visit/Encounter Number: [id number]  Consult ID: [id number]  Facility Time Zone: CT  Date and Time of Request: 2021 05:39 PM  Requesting Clinician: Amaya Ross MD  Patient Name: Trinity Newell  YOB: 1950  Gender: Female  Patient identity was confirmed at the beginning of the consult with the   patient/family/staff using two personal identifiers: Patient name and       **Admission**  Admission Date: 2021  Chief reason for ICU admission: Altered Mental Status    **Core Metrics**  General orienting sentence for patient: 72 yo female with MS changes PROSPER,   intubated for airway protection, on LR/abx for aspiration pna noted acute   CVA. Not alert, not following commands. Propofol re-added   because of perceived discomfort on vent. Chief physiologic deterioration:   Change in mental status  Is the patient on DVT prophylaxis?: Yes  Prophylaxis type: Mechanical, Pharmacological  Is the patient on GI prophylaxis?: No  Has this patient reached their nutritional goal?: Yes  Are there current issues with pain management in this patient?:   No  Are there issues with skin integrity?: No  Are there issues with delirium?: Yes and issues are being addressed  Has the patient been mobilized?: No  Is this patient currently intubated?: No  Are there ethical or care philosophy or family issues?: No  Do you recommend an in depth evaluation?: No  Do you recommend the patient should: : Continue ICU level of care    **Inserted/Removed Devices**  Device Name: Briones Catheter  Site of insertion: Bladder  Date of insertion: Unknown  Device Name: Endotracheal Tube  Site of insertion: Trachea  Date of insertion: Unknown

## 2021-05-29 NOTE — PROGRESS NOTES
Q24H Julienne Lozano MD   30 mg at 05/29/21 1224    atropine 1 % ophthalmic solution 1 drop  1 drop Sublingual Q4H PRN Julienne Lozano MD   1 drop at 05/28/21 1655    perflutren lipid microspheres (DEFINITY) injection 1.65 mg  1.5 mL Intravenous ONCE PRN Boy Dos Santos MD   1.65 mg at 05/28/21 1350    piperacillin-tazobactam (ZOSYN) 3,375 mg in dextrose 5 % 50 mL IVPB (mini-bag)  3,375 mg Intravenous Q6H Julienne Lozano MD   Stopped at 05/29/21 1254    lactated ringers infusion   Intravenous Continuous Julienne Lozano MD 75 mL/hr at 05/29/21 0847 New Bag at 05/29/21 0847    insulin lispro (HUMALOG) injection vial 0-6 Units  0-6 Units Subcutaneous TID WC Julienne Lozano MD   2 Units at 05/27/21 1646    insulin lispro (HUMALOG) injection vial 0-3 Units  0-3 Units Subcutaneous Nightly Julienne Lozano MD   1 Units at 05/27/21 2312    propofol injection  5-50 mcg/kg/min Intravenous Titrated Julienne Lozano MD 10.2 mL/hr at 05/29/21 1250 15 mcg/kg/min at 05/29/21 1250    glucose (GLUTOSE) 40 % oral gel 15 g  15 g Oral PRN Julienne Lozano MD        dextrose 50 % IV solution  12.5 g Intravenous PRN Julienne Lozano MD        glucagon (rDNA) injection 1 mg  1 mg Intramuscular PRN Julienne Lozano MD        dextrose 5 % solution  100 mL/hr Intravenous PRN Julienne Lozano MD        cloNIDine (CATAPRES) 0.1 MG/24HR 1 patch  1 patch Transdermal Weekly Julienne Lozano MD   1 patch at 05/28/21 0135    levothyroxine (SYNTHROID) tablet 112 mcg  112 mcg Oral Daily Julienne Lozano MD        [Held by provider] lisinopril (PRINIVIL;ZESTRIL) tablet 20 mg  20 mg Oral Daily Julienne Lozano MD        [Held by provider] spironolactone (ALDACTONE) tablet 25 mg  25 mg Oral BID Julienne Lzoano MD        [Held by provider] furosemide (LASIX) tablet 20 mg  20 mg Oral Daily Julienne Lozano MD        famotidine (PEPCID) injection 20 mg  20 mg Intravenous Daily Julienne Lozano MD   20 mg at 05/29/21 0710       Past Medical History:  Past Medical History:   Diagnosis Date    Abdominal pain     Anemia     Anxiety     Chronic back pain     Depression     Diverticulitis     Hyperlipidemia     Hypertension     Hypothyroidism     Obesity     Palliative care patient 05/28/2021    Renal insufficiency     Type 2 diabetes mellitus ([facility]        Past Surgical History:  Past Surgical History:   Procedure Laterality Date    TOTAL KNEE ARTHROPLASTY         Family History  Family History   Problem Relation Age of Onset    Hypertension Mother     Diabetes Mother     Hypertension Father     Diabetes Father        Social History  Social History     Socioeconomic History    Marital status: Unknown     Spouse name: Not on file    Number of children: Not on file    Years of education: Not on file    Highest education level: Not on file   Occupational History    Not on file   Tobacco Use    Smoking status: Never Smoker    Smokeless tobacco: Never Used   Substance and Sexual Activity    Alcohol use: Never    Drug use: Never    Sexual activity: Not on file   Other Topics Concern    Not on file   Social History Narrative    Not on file     Social Determinants of Health     Financial Resource Strain:     Difficulty of Paying Living Expenses:    Food Insecurity:     Worried About Running Out of Food in the Last Year:     [address] in the Last Year:    Transportation Needs:     Lack of Transportation (Medical):      Lack of Transportation (Non-Medical):    Physical Activity:     Days of Exercise per Week:     Minutes of Exercise per Session:    Stress:     Feeling of Stress :    Social Connections:     Frequency of Communication with Friends and Family:     Frequency of Social Gatherings with Friends and Family:     Attends Synagogue Services:     Active Member of Clubs or Organizations:     Attends Club or Organization Meetings:     Marital Status:    Intimate Partner Violence:     Fear of Current or Ex-Partner:     Emotionally Abused:     Physically Abused:     Sexually Abused:          Review of Systems:  History obtained from unobtainable from patient due to mental status      Objective:  Patient Vitals for the past 24 hrs:   BP Temp Temp src Pulse Resp SpO2 Height   05/29/21 1200 (!) 182/65 98 °F (36.7 °C) Temporal 73 18 99 %    05/29/21 1100 (!) 181/67   70 15 100 %    05/29/21 1012    91 19 98 %    05/29/21 1000 (!) 182/86   98 19 97 %    05/29/21 0935       5' 8\" (1.727 m)   05/29/21 0900 (!) 163/68   77 15 98 %    05/29/21 0800 (!) 189/72   81 16 97 %    05/29/21 0700 (!) 200/79 97 °F (36.1 °C) Temporal 81 18 97 %    05/29/21 0616    88 18     05/29/21 0600 (!) 176/73   79 18 96 %    05/29/21 0500 (!) 199/94   100 25 97 %    05/29/21 0400 (!) 183/82 99.3 °F (37.4 °C) Temporal 90 15 98 %    05/29/21 0300 (!) 190/80   95 19 98 %    05/29/21 0222    92 18 97 %    05/29/21 0200 (!) 194/94   93 19 97 %    05/29/21 0100 (!) 185/84   92 14 98 %    05/29/21 0000 (!) 196/74 98.3 °F (36.8 °C) Temporal 94 16 98 %    05/28/21 2300 (!) 193/78   102 18 98 %    05/28/21 2200 (!) 152/67   90 16 97 %    05/28/21 2153    92 17 97 %    05/28/21 2100 (!) 188/81     97 %    05/28/21 2000 (!) 183/79 98.3 °F (36.8 °C) Temporal 95 17 98 %    05/28/21 1951    88 18 98 %    05/28/21 1900 (!) 181/69   89 17 100 %    05/28/21 1800 (!) 187/72   89 15 100 %    05/28/21 1700 (!) 191/75   94 18 100 %    05/28/21 1600 (!) 186/68 98.1 °F (36.7 °C) Temporal 87 16 100 %    05/28/21 1500 (!) 193/70   94 20 100 %    05/28/21 1420    81 18 100 %    05/28/21 1400 (!) 178/71   83 18 100 %        Intake/Output Summary (Last 24 hours) at 5/29/2021 1334  Last data filed at 5/29/2021 1200  Gross per 24 hour   Intake 2294.8 ml   Output 2020 ml   Net 274.8 ml       General: vent/ill appearing  Chest:  unlabored on vent with coarse bs bilat  CVS: regular rate and rhythm  Abdominal:

## 2021-05-29 NOTE — CONSULTS
**Physician Signature**  This document was electronically signed by: Roselyn Stone MD  2021   12:10 PM    **Consult Information**  Member Facility: [address] [place] [address] MRN: [id number]  Visit/Encounter Number: [id number]  Consult ID: [id number]  Facility Time Zone: CT  Date and Time of Request: 2021 09:53 AM  Requesting Clinician: Lisa Love MD  Patient Name: Aaliyah Goddard  YOB: 1950  Gender: Female  Patient identity was confirmed at the beginning of the consult with the   patient/family/staff using two personal identifiers: Patient name and       **Admission**  Admission Date: 2021  Chief reason for ICU admission: Altered Mental Status  Other reason for ICU admission: Acute CVA    **Core Metrics**  General orienting sentence for patient: 72 yo female with MS changes PROSPER,   intubated for airway protection, on LR/abx for aspiration pna noted acute   CVA. Not alert, not following commands. Propofol re-added   because of perceived discomfort on vent. Remains on 15 of propofol. Agitated   but noninteractive when off. Biting on   ETT. Lots of thin secretions. PRN oral atropine drops  Chief physiologic deterioration: Change in mental status  Is the patient on DVT prophylaxis?: Yes  Prophylaxis type: Mechanical, Pharmacological  DVT Prophylaxis Comments: Lovenox  Is the patient on GI prophylaxis?: Yes  Gi Prophylaxis Comments: Pepcid  Has this patient reached their nutritional goal?: No and issues are being   addressed  Nutritional Goals Details: RN concerned about absent BS. KUB unremarkable. Suggest 2TBSP/hr tube feeds, eg. 30 cc/hr. Are there current issues with pain management in this patient?:   No  Are there issues with skin integrity?: Yes and issues are being   addressed  Skin Integrity Details: Sacral erythema, adipose folds with topical   tx.   Are there issues with delirium?: Yes and issues are being addressed  Has the patient been mobilized?: No  Is this patient currently intubated?: Yes  Are there ethical or care philosophy or family issues?: No  Do you recommend an in depth evaluation?: No    **Inserted/Removed Devices**  Device Name: Briones Catheter  Site of insertion: Bladder  Date of insertion: Unknown  Device Name: Endotracheal Tube  Site of insertion: Trachea  Date of insertion: Unknown  Device Name: Orogastric Tube  Site of insertion: Oropharynx  Date of insertion: 05-  Device Name: Central venous catheter  Site of insertion: Interjugular - Right  Date of insertion: 05-

## 2021-05-29 NOTE — PROGRESS NOTES
I turned off propofol do to a sedation vacation for this patient. The patient is unable to make eye contact or follow my finger. The patient is unable to follow commands. I asked the patient several times to squeeze my hand and move her toes. She is unable to do so at this time. Propofol turned back on for comfort. Will continue to monitor.

## 2021-05-30 NOTE — PROGRESS NOTES
[address], [address]  [place], [place]  Phone [phone]     Neurology Progress Note  2021 1:10 PM  Information:   Patient Name: Misael Diego  :   1950  Age:   73 y.o. MRN:   [id number]  Account #:  [de-identified]  Admit Date:   2021  Today:  21     ADMIT DX:   Acute respiratory failure    Subjective:     Danii Scott. Shawn Jones is a 72 YO woman with hypertension, hyperlipidemia, and diabetes who was found unresponsive at home . She was noted to have no left limb movement and a right gaze preference. Interval History:   She remains minimally responsive, intubated and sedated in the ICU. No new problems.     Objective:     Past Medical History:  Past Medical History:   Diagnosis Date    Abdominal pain     Anemia     Anxiety     Chronic back pain     Depression     Diverticulitis     Hyperlipidemia     Hypertension     Hypothyroidism     Obesity     Palliative care patient 2021    Renal insufficiency     Type 2 diabetes mellitus ([facility] [address]        Past Surgical History:   Procedure Laterality Date    TOTAL KNEE ARTHROPLASTY         Family History   Problem Relation Age of Onset    Hypertension Mother     Diabetes Mother     Hypertension Father     Diabetes Father        Social History     Socioeconomic History    Marital status: Unknown     Spouse name: Not on file    Number of children: Not on file    Years of education: Not on file    Highest education level: Not on file   Occupational History    Not on file   Tobacco Use    Smoking status: Never Smoker    Smokeless tobacco: Never Used   Substance and Sexual Activity    Alcohol use: Never    Drug use: Never    Sexual activity: Not on file   Other Topics Concern    Not on file   Social History Narrative    Not on file     Social Determinants of Health     Financial Resource Strain:     Difficulty of Paying Living Expenses:    Food Insecurity:     Worried About Running Out of Food in the Last Year:    [address] in the Last Year:    Transportation Needs:     Lack of Transportation (Medical):      Lack of Transportation (Non-Medical):    Physical Activity:     Days of Exercise per Week:     Minutes of Exercise per Session:    Stress:     Feeling of Stress :    Social Connections:     Frequency of Communication with Friends and Family:     Frequency of Social Gatherings with Friends and Family:     Attends [employer] Services:     Active Member of Clubs or Organizations:     Attends Club or Organization Meetings:     Marital Status:    Intimate Partner Violence:     Fear of Current or Ex-Partner:     Emotionally Abused:     Physically Abused:     Sexually Abused:        Medications:   propofol 15 mcg/kg/min (05/30/21 9249)    dextrose        metoprolol tartrate  50 mg Oral BID    enoxaparin  40 mg Subcutaneous Q24H    famotidine (PEPCID) injection  20 mg Intravenous BID    cloNIDine  1 patch Transdermal Weekly    vitamin D  50,000 Units Oral Weekly    PARoxetine  20 mg Oral Daily    atorvastatin  20 mg Oral Nightly    piperacillin-tazobactam  3,375 mg Intravenous Q6H    insulin lispro  0-6 Units Subcutaneous TID WC    insulin lispro  0-3 Units Subcutaneous Nightly    levothyroxine  112 mcg Oral Daily    [Held by provider] lisinopril  20 mg Oral Daily    [Held by provider] spironolactone  25 mg Oral BID    [Held by provider] furosemide  20 mg Oral Daily       Diagnostic Studies:  Reviewed all labs/diagnositcs since last 24hrs:  Recent Results (from the past 24 hour(s))   POCT Glucose    Collection Time: 05/29/21  4:32 PM   Result Value Ref Range    POC Glucose 151 (H) 70 - 99 mg/dl    Performed on AccuChek    CBC Auto Differential    Collection Time: 05/30/21  4:00 AM   Result Value Ref Range    WBC 21.4 (H) 4.8 - 10.8 K/uL    RBC 3.12 (L) 4.20 - 5.40 M/uL    Hemoglobin 9.7 (L) 12.0 - 16.0 g/dL    Hematocrit 29.4 (L) 37.0 - 47.0 %    MCV 94.2 81.0 - 99.0 fL MCH 31.1 (H) 27.0 - 31.0 pg    MCHC 33.0 33.0 - 37.0 g/dL    RDW 13.0 11.5 - 14.5 %    Platelets 987 474 - 777 K/uL    MPV 10.8 9.4 - 12.3 fL    Neutrophils % 90.7 (H) 50.0 - 65.0 %    Lymphocytes % 4.1 (L) 20.0 - 40.0 %    Monocytes % 3.2 0.0 - 10.0 %    Eosinophils % 0.0 0.0 - 5.0 %    Basophils % 0.2 0.0 - 1.0 %    Neutrophils Absolute 19.4 (H) 1.5 - 7.5 K/uL    Immature Granulocytes # 0.4 K/uL    Lymphocytes Absolute 0.9 (L) 1.1 - 4.5 K/uL    Monocytes Absolute 0.70 0.00 - 0.90 K/uL    Eosinophils Absolute 0.00 0.00 - 0.60 K/uL    Basophils Absolute 0.00 0.00 - 0.20 K/uL   Basic Metabolic Panel    Collection Time: 05/30/21  4:00 AM   Result Value Ref Range    Sodium 146 (H) 136 - 145 mmol/L    Potassium 4.6 3.5 - 5.0 mmol/L    Chloride 112 (H) 98 - 111 mmol/L    CO2 21 (L) 22 - 29 mmol/L    Anion Gap 13 7 - 19 mmol/L    Glucose 175 (H) 74 - 109 mg/dL    BUN 39 (H) 8 - 23 mg/dL    CREATININE 1.4 (H) 0.5 - 0.9 mg/dL    GFR Non- 37 (A) >60    GFR  45 (L) >59    Calcium 9.0 8.8 - 10.2 mg/dL   Magnesium    Collection Time: 05/30/21  4:00 AM   Result Value Ref Range    Magnesium 2.0 1.6 - 2.4 mg/dL   POCT Glucose    Collection Time: 05/30/21  8:13 AM   Result Value Ref Range    POC Glucose 174 (H) 70 - 99 mg/dl    Performed on PushToTest        Diet:  DIET TUBE FEED CONTINUOUS/CYCLIC NPO; Renal Formula (Nepro); Orogastric; Continuous; 10; 35; 24    Examination:  Vitals: BP (!) 151/48   Pulse 90   Temp 98.8 °F (37.1 °C)   Resp 21   Ht 5' 8\" (1.727 m)   Wt 247 lb 1.6 oz (112.1 kg)   SpO2 96%   BMI 37.57 kg/m²      Intake/Output Summary (Last 24 hours) at 5/30/2021 1310  Last data filed at 5/30/2021 0400  Gross per 24 hour   Intake 1583.2 ml   Output 1145 ml   Net 438.2 ml     General appearance:  She is an obese woman who is sedated and intubated in the ICU. She is presently on 15mcg/kg/hr propofol  HEENT: Exam; heent: Head: Normal, normocephalic, atraumatic.   Lungs: clear to

## 2021-05-30 NOTE — CONSULTS
**Physician Signature**  This document was electronically signed by: Marcel Castellanos MD  2021   11:23 AM    **Consult Information**  Member Facility: [address] [place] [address] MRN: [id number]  Visit/Encounter Number: [id number]  Consult ID: [id number]  Facility Time Zone: CT  Date and Time of Request: 2021 05:24 AM  Requesting Clinician: Kalyan Adame MD  Patient Name: Brisa Rose  YOB: 1950  Gender: Female  Patient identity was confirmed at the beginning of the consult with the   patient/family/staff using two personal identifiers: Patient name and       **Admission**  Admission Date: 2021  Chief reason for ICU admission: Altered Mental Status  Other reason for ICU admission: Acute CVA    **Core Metrics**  General orienting sentence for patient: 74 yo female with MS changes PROSPER,   intubated for airway protection, on LR/abx for aspiration pna noted acute   CVA. Not alert, not following commands. Propofol re-added   because of perceived discomfort on vent. Remains on 15 of propofol. Agitated   but noninteractive when off. Biting on   ETT. Lots of thin secretions. PRN oral atropine drops neurologically. have   nonpurposeful movements and propofol back on b/o perceived agitation. BP skyrocketed, better on propofol 15. ? Precedex  Chief physiologic deterioration: Change in mental status  Is the patient on DVT prophylaxis?: Yes  Prophylaxis type: Mechanical, Pharmacological  DVT Prophylaxis Comments: Lovenox  Is the patient on GI prophylaxis?: Yes  Gi Prophylaxis Comments: Pepcid  Has this patient reached their nutritional goal?: No and issues are being   addressed  Nutritional Goals Details: Trickle feeds 15cc/hr  Are there current issues with pain management in this patient?:   No  Are there issues with skin integrity?: Yes and issues are being   addressed  Skin Integrity Details: Sacral erythema, adipose folds with topical   tx.   Are there issues with delirium?: Yes and issues are being addressed  Has the patient been mobilized?: No  Is this patient currently intubated?: Yes  Are there ethical or care philosophy or family issues?: Yes  Family Issues Details: Daughter considering withdrawal of support.   Do you recommend an in depth evaluation?: No  Do you recommend the patient should: : Continue ICU level of care    **Inserted/Removed Devices**  Device Name: Briones Catheter  Site of insertion: Bladder  Date of insertion: Unknown  Device Name: Endotracheal Tube  Site of insertion: Trachea  Date of insertion: Unknown  Device Name: Orogastric Tube  Site of insertion: Oropharynx  Date of insertion: 05-  Device Name: Central venous catheter  Site of insertion: Interjugular - Right  Date of insertion: 05-

## 2021-05-30 NOTE — CONSULTS
**Physician Signature**  This document was electronically signed by: Zuleyka Rendon MD  2021   10:10 PM    **Consult Information**  Member Facility: [address] [place] [address] Drive MRN: [id number]  Visit/Encounter Number: [id number]  Consult ID: [id number]  Facility Time Zone: CT  Date and Time of Request: 2021 03:15 PM  Requesting Clinician: Mata Granados MD  Patient Name: Gaby Simms  YOB: 1950  Gender: Female  Patient identity was confirmed at the beginning of the consult with the   patient/family/staff using two personal identifiers: Patient name and       **Admission**  Admission Date: 2021  Chief reason for ICU admission: Altered Mental Status  Other reason for ICU admission: Acute CVA    **Core Metrics**  General orienting sentence for patient: 72 yo female with MS changes PROSPER,   intubated for airway protection, on LR/abx for aspiration pna noted acute   CVA. Not alert, not following commands. Propofol re-added   because of perceived discomfort on vent. Remains on 15 of propofol. Agitated   but noninteractive when off. Biting on   ETT. Lots of thin secretions. PRN oral atropine drops neurologically. Chief physiologic deterioration: Change in mental status  Is the patient on DVT prophylaxis?: Yes  Prophylaxis type: Mechanical, Pharmacological  DVT Prophylaxis Comments: Lovenox  Is the patient on GI prophylaxis?: Yes  Gi Prophylaxis Comments: Pepcid  Has this patient reached their nutritional goal?: No and issues are being   addressed  Nutritional Goals Details: RN concerned about absent BS. KUB unremarkable. Suggest 2TBSP/hr tube feeds, eg. 30 cc/hr. Are there current issues with pain management in this patient?:   No  Are there issues with skin integrity?: Yes and issues are being   addressed  Skin Integrity Details: Sacral erythema, adipose folds with topical   tx.   Are there issues with delirium?: Yes and issues are being addressed  Has the patient been mobilized?: No  Is this patient currently intubated?: Yes  Are there ethical or care philosophy or family issues?: No  Do you recommend an in depth evaluation?: No  Do you recommend the patient should: : Continue ICU level of care    **Inserted/Removed Devices**  Device Name: Briones Catheter  Site of insertion: Bladder  Date of insertion: Unknown  Device Name: Endotracheal Tube  Site of insertion: Trachea  Date of insertion: Unknown  Device Name: Orogastric Tube  Site of insertion: Oropharynx  Date of insertion: 05-  Device Name: Central venous catheter  Site of insertion: Interjugular - Right  Date of insertion: 05-

## 2021-05-30 NOTE — PROGRESS NOTES
Spoke with family about the potential for comfort care, given likely hanson of severe neurological deficits related to the CVA. Daughter Nirav Cap at bedside with pt and stated that her mother would not have wanted \"all this\" referring to life support measures and that she wants to honor her mother's wishes. Daughter stated that she was going to talk to her other sisters and the rest of the family to come to a decision. I notified Dr. Angelica Reno about discussions had with family, he plans to speak with them about comfort care as well.

## 2021-05-30 NOTE — PROGRESS NOTES
Recent Labs     05/29/21  0535 05/30/21  0400   BUN 61* 39*       Recent Labs     05/29/21  0535 05/30/21  0400   CREATININE 2.0* 1.4*       Estimated Creatinine Clearance: 50 mL/min (A) (based on SCr of 1.4 mg/dL (H)).       Plan: Change Famotidine to 20 mg IV BID    Electronically signed by SARAH Mendoza [facility] on 5/30/2021 at 11:00 AM

## 2021-05-30 NOTE — PROGRESS NOTES
Recent Labs     05/29/21  0535 05/30/21  0400   BUN 61* 39*       Recent Labs     05/29/21  0535 05/30/21  0400   CREATININE 2.0* 1.4*       Estimated Creatinine Clearance: 50 mL/min (A) (based on SCr of 1.4 mg/dL (H)).       Plan: Change Enoxaparin to 40 mg daily     Electronically signed by Beck Gallagher, [facility] on 5/30/2021 at 10:59 AM

## 2021-05-30 NOTE — PROGRESS NOTES
Nephrology ([facility]) Consult Note      Patient:  Agnes Gonzalez  YOB: 1950  Date of Service: 5/30/2021  MRN: [id number]   Acct: [de-identified]   Primary Care Physician: RODRIGO Hay CNP  Advance Directive: DNR-CC  Admit Date: 5/27/2021       Hospital Day: 3  Referring Provider: Robson Wen MD    Patient independently seen and examined, Chart, Consults, Notes, Operative notes, Labs, Cardiology, and Radiology studies reviewed as available. Subjective:  Agnes Gonzalez is a 73 y.o. female  whom we were consulted for Hyperkalemia. No labs were immediately available and pt was in COVID-19 ruleout. Found down at home with last normal 2200 the night before. Found in vomit. Reviewed with several family, they were unaware of prior renal issues. Pt was reportedly feeling badly for several days prior but refused to go to the hospital.  Examination completed under COVID-19 precautions initially. Unable to participate in the H&P due to vent status. Today, no overnight events. She remains on the ventilator and unable to participate in the history and physical examination. Urine output nonoliguric. Family not present.   Goals of care discussions ongoing with family and primary      Allergies:  Aspirin, Biaxin [clarithromycin], Brethaire [terbutaline], Haemophilus influenzae vaccines, Other, and Pcn [penicillins]    Medicines:  Current Facility-Administered Medications   Medication Dose Route Frequency Provider Last Rate Last Admin    metoprolol tartrate (LOPRESSOR) tablet 50 mg  50 mg Oral BID Robson Wen MD   50 mg at 05/30/21 1243    enoxaparin (LOVENOX) injection 40 mg  40 mg Subcutaneous Q24H Robson Wen MD   40 mg at 05/30/21 1243    famotidine (PEPCID) injection 20 mg  20 mg Intravenous BID Robson Wen MD        scopolamine (TRANSDERM-SCOP) transdermal patch 1 patch  1 patch Transdermal Q72H Robson Wen MD   1 patch at 05/30/21 7260    hydrALAZINE (APRESOLINE) injection 10 mg  10 mg Intravenous Q6H PRN Harl Prior, MD   10 mg at 05/30/21 1007    labetalol (NORMODYNE;TRANDATE) injection 20 mg  20 mg Intravenous Q4H PRN Harl Prior, MD   20 mg at 05/29/21 1846    cloNIDine (CATAPRES) 0.1 MG/24HR 1 patch  1 patch Transdermal Weekly Harl Prior, MD   1 patch at 05/29/21 1713    vitamin D (ERGOCALCIFEROL) capsule 50,000 Units  50,000 Units Oral Weekly Harl Prior, MD        PARoxetine (PAXIL) tablet 20 mg  20 mg Oral Daily Harl Prior, MD   20 mg at 05/30/21 0809    atorvastatin (LIPITOR) tablet 20 mg  20 mg Oral Nightly Harl Prior, MD   20 mg at 05/29/21 2140    atropine 1 % ophthalmic solution 1 drop  1 drop Sublingual Q4H PRN Harl Prior, MD   1 drop at 05/28/21 1655    piperacillin-tazobactam (ZOSYN) 3,375 mg in dextrose 5 % 50 mL IVPB (mini-bag)  3,375 mg Intravenous Q6H Harl Prior, MD   Stopped at 05/30/21 1359    insulin lispro (HUMALOG) injection vial 0-6 Units  0-6 Units Subcutaneous TID WC Harl Prior, MD   1 Units at 05/30/21 1366    insulin lispro (HUMALOG) injection vial 0-3 Units  0-3 Units Subcutaneous Nightly Harl Prior, MD   1 Units at 05/27/21 2312    propofol injection  5-50 mcg/kg/min Intravenous Titrated Harl Prior, MD 10.2 mL/hr at 05/30/21 0832 15 mcg/kg/min at 05/30/21 0832    glucose (GLUTOSE) 40 % oral gel 15 g  15 g Oral PRN Harl Prior, MD        dextrose 50 % IV solution  12.5 g Intravenous PRN Harl Prior, MD        glucagon (rDNA) injection 1 mg  1 mg Intramuscular PRN Harl Prior, MD        dextrose 5 % solution  100 mL/hr Intravenous PRN Harl Prior, MD        levothyroxine (SYNTHROID) tablet 112 mcg  112 mcg Oral Daily Harl Prior, MD   112 mcg at 05/30/21 0809    [Held by provider] lisinopril (PRINIVIL;ZESTRIL) tablet 20 mg  20 mg Oral Daily Senait Flores MD        [Held by provider] spironolactone (ALDACTONE) tablet 25 mg  25 mg Oral BID Senait Flores, MD Geovanny Albert [Held by provider] furosemide (LASIX) tablet 20 mg  20 mg Oral Daily Mikhail Rivera MD           Past Medical History:  Past Medical History:   Diagnosis Date    Abdominal pain     Anemia     Anxiety     Chronic back pain     Depression     Diverticulitis     Hyperlipidemia     Hypertension     Hypothyroidism     Obesity     Palliative care patient 05/28/2021    Renal insufficiency     Type 2 diabetes mellitus ([facility] [address]        Past Surgical History:  Past Surgical History:   Procedure Laterality Date    TOTAL KNEE ARTHROPLASTY         Family History  Family History   Problem Relation Age of Onset    Hypertension Mother     Diabetes Mother     Hypertension Father     Diabetes Father        Social History  Social History     Socioeconomic History    Marital status: Unknown     Spouse name: Not on file    Number of children: Not on file    Years of education: Not on file    Highest education level: Not on file   Occupational History    Not on file   Tobacco Use    Smoking status: Never Smoker    Smokeless tobacco: Never Used   Substance and Sexual Activity    Alcohol use: Never    Drug use: Never    Sexual activity: Not on file   Other Topics Concern    Not on file   Social History Narrative    Not on file     Social Determinants of Health     Financial Resource Strain:     Difficulty of Paying Living Expenses:    Food Insecurity:     Worried About Running Out of Food in the Last Year:     [address] in the Last Year:    Transportation Needs:     Lack of Transportation (Medical):      Lack of Transportation (Non-Medical):    Physical Activity:     Days of Exercise per Week:     Minutes of Exercise per Session:    Stress:     Feeling of Stress :    Social Connections:     Frequency of Communication with Friends and Family:     Frequency of Social Gatherings with Friends and Family:     Attends [employer] Services:     Active Member of Clubs or Organizations:     Attends Club or Organization Meetings:     Marital Status:    Intimate Partner Violence:     Fear of Current or Ex-Partner:     Emotionally Abused:     Physically Abused:     Sexually Abused:          Review of Systems:  History obtained from unobtainable from patient due to mental status      Objective:  Patient Vitals for the past 24 hrs:   BP Temp Temp src Pulse Resp SpO2   05/30/21 1600 (!) 173/60 98.9 °F (37.2 °C)  82 16 96 %   05/30/21 1500 (!) 167/58   80 16 96 %   05/30/21 1424    82 16 96 %   05/30/21 1400 (!) 131/53   75 24 95 %   05/30/21 1300 (!) 151/48   90 21 96 %   05/30/21 1243 (!) 170/60   86     05/30/21 1200 (!) 164/59 98.8 °F (37.1 °C) Temporal 84 18 95 %   05/30/21 1100 (!) 156/55   86 17 96 %   05/30/21 1038    94 21 96 %   05/30/21 1007 (!) 182/65        05/30/21 1000 (!) 182/65   90 15 96 %   05/30/21 0900 (!) 178/69   82 19 94 %   05/30/21 0800 (!) 156/54 99.9 °F (37.7 °C)  83 19 95 %   05/30/21 0700 (!) 160/60   89 20 96 %   05/30/21 0600 (!) 158/58   89 23 96 %   05/30/21 0500 (!) 172/61   105 22 95 %   05/30/21 0400 (!) 178/67 97.5 °F (36.4 °C) Temporal 90 24 96 %   05/30/21 0300 (!) 188/68   85 21 96 %   05/30/21 0200 (!) 160/61   72 19 97 %   05/30/21 0159    83 20 98 %   05/30/21 0100 (!) 167/72   89 20 96 %   05/30/21 0000 (!) 172/67 98.5 °F (36.9 °C) Temporal 92 20 96 %   05/29/21 2300 (!) 156/67   85 18 94 %   05/29/21 2248    90 20 94 %   05/29/21 2200 (!) 173/61   103 27 95 %   05/29/21 2100 (!) 176/63   92 19 96 %   05/29/21 2000 (!) 148/98 98.7 °F (37.1 °C) Temporal 87 18 95 %   05/29/21 1918     21 95 %   05/29/21 1902    94 23 95 %   05/29/21 1900 (!) 164/67   94 19 96 %   05/29/21 1800 (!) 153/58   89 20 96 %       Intake/Output Summary (Last 24 hours) at 5/30/2021 1702  Last data filed at 5/30/2021 1600  Gross per 24 hour   Intake 2317.04 ml   Output 1520 ml   Net 797.04 ml       General: vent/ill appearing  Chest: unlabored on vent with coarse bs bilat  CVS: regular rate and rhythm  Abdominal: nondistended, minimal bs  Extremities: no cyanosis or edema  Skin: no rash noted  Psych: unable to assess due to vent  Neuro: unable to assess due to vent        Labs:  BMP:   Recent Labs     05/28/21  0400 05/28/21  0440 05/29/21  0535 05/30/21  0400     --  141 146*   K 5.7* 5.8 4.8 4.6     --  109 112*   CO2 19*  --  22 21*   PHOS 3.2  --   --   --    BUN 76*  --  61* 39*   CREATININE 2.5*  --  2.0* 1.4*   CALCIUM 9.0  --  9.2 9.0     CBC:   Recent Labs     05/28/21  0400 05/29/21  0535 05/30/21  0400   WBC 24.4* 19.4* 21.4*   HGB 10.5* 10.1* 9.7*   HCT 33.0* 31.4* 29.4*   MCV 95.1 95.4 94.2    151 144     LIVER PROFILE:   Recent Labs     05/28/21 0400   AST 21   ALT 11   BILITOT 0.6   ALKPHOS 86     PT/INR: No results for input(s): PROTIME, INR in the last 72 hours. APTT: No results for input(s): APTT in the last 72 hours. BNP:  No results for input(s): BNP in the last 72 hours. Ionized Calcium:No results for input(s): IONCA in the last 72 hours. Magnesium:  Recent Labs     05/28/21  0400 05/29/21  0535 05/30/21  0400   MG 1.8 1.9 2.0     Phosphorus:  Recent Labs     05/28/21  0400   PHOS 3.2     HgbA1C:   Recent Labs     05/28/21  0400   LABA1C 6.3*     Hepatic:   Recent Labs     05/28/21  0400   ALKPHOS 86   ALT 11   AST 21   PROT 6.5*   BILITOT 0.6   LABALBU 3.2*     Lactic Acid:   No results for input(s): LACTA in the last 72 hours. Troponin: No results for input(s): CKTOTAL, CKMB, TROPONINT in the last 72 hours. ABGs: No results for input(s): PH, PCO2, PO2, HCO3, O2SAT in the last 72 hours. CRP:  No results for input(s): CRP in the last 72 hours. Sed Rate:  No results for input(s): SEDRATE in the last 72 hours. Cultures:   No results for input(s): CULTURE in the last 72 hours. Recent Labs     05/27/21  1739   BLOODCULT2 No Growth to date. Any change in status will be called.      No results for input(s): CXSURG in the last 72 hours. Radiology reports as per the Radiologist  Radiology: XR CHEST PORTABLE    Result Date: 5/27/2021  Exam: XR CHEST PORTABLE - 5/27/2021 2:33 PM Indication: ETT placement and RIGHT central venous line placement Comparison: None available. Findings: Endotracheal tube is at the durga pointed towards the RIGHT mainstem bronchus, consider retraction by 2 to 3 cm. RIGHT sided CVL tip overlies the low SVC. Cardiac silhouette is mildly enlarged. RIGHT mid to lower lung zone patchy airspace opacity. LEFT basilar atelectasis. No large pleural effusion. No pneumothorax. 1.  Endotracheal tube is at the durga pointed towards the RIGHT mainstem bronchus, consider retraction by 2 to 3 cm. 2.  RIGHT sided CVL tip overlies the low SVC. 3.  RIGHT lower lobe airspace opacity, with differential including pneumonia and aspiration. Signed by Dr Veronica Desir on 5/27/2021 3:54 PM       Assessment   PROSPER  Hyperkalemia  PNA  Acute hypoxic/hypercapnic respiratory failure  Acute encephalopathy due to acute right MCA infarct  Obesity    Plan:  D/w nursing/hospitalist  Continue to medically manage K as needed  Wean IVFs  Tube feeds  Neuro w/u ongoing, MRI findings reviewed  UOP/creat improved  Goals of care discussion noted    Thank you for the consult, we appreciate the opportunity to provide care to your patients. Feel free to contact me if I can be of any further assistance.       Javon Balderas MD  05/30/21  5:02 PM

## 2021-05-30 NOTE — PROGRESS NOTES
Propofol paused for sedation vacation. She opened eyes and stared right without any focus on me or eye movement when asked  She moved all extremities, but not to command. The right had seemed to be significantly weaker than the left. At the end of the sedation vacation, she began to cough from vent, BP and HR elevated. Sedation vacation was then stopped and the propofol turned back on to 15 mcg/kg/min   Both Dr. Lani Hawk and Dr. Ashok Carrasquillo notified of sedation vacation outcome.

## 2021-05-31 PROBLEM — J96.01 ACUTE RESPIRATORY FAILURE WITH HYPOXIA (HCC): Status: ACTIVE | Noted: 2021-01-01

## 2021-05-31 NOTE — DISCHARGE SUMMARY
Hospitalist Discharge Summary  [facility]    Patient: Jane Martinez  : 1950  MRN: [id number]  Code Status: DNR-CC  PCP: RODRIGO Ojeda - MISHA  Attending: Valorie Zhang MD  Admission Date: 2021  Discharge Date: 2021  Length of Stay: 4 days    Discharge Medications:   As per hospice    Hospital Course:   Acute right MCA infarct  Per MRI 2021  Last known well time midnight 2021  Daughter found patient at 8 AM 2021 unresponsive/covered in vomit/RUE contracted  Neurology following  History of DM2/HLD/HTN/morbid obesity  HbA1c 6.3  TSH 0.44  FLP reviewed  TTE reviewed  Neurochecks  PT/OT/SLP once able  Statin  Aspirin allergy     Aspiration pneumonia versus CAP  Empiric broad-spectrum antibiotics  Follow cultures  Molecular respiratory panel negative     Sepsis  Suspect secondary to above processes  Empiric broad-spectrum antibiotics  Follow cultures  IVF  Lactate 2.0     Ventilator dependent acute respiratory failure  Secondary to above processes  Titrate minute ventilation for pH 7.35  Follow ABG/CXR  SBT once able     PROSPER/hyperkalemia  Unsure of baseline creatinine, improving  Hyperkalemia resolved  Renal following  Avoid offending agents  Follow renal function/urine output/electrolytes     DM2  Meds on board     Morbid obesity     DVT prophylaxis  Lovenox     Extensive discussion with patient's family in regards to current clinical state.  All questions sought and answered.     Extensive discussion with patient's daughter Kristel Silva and sister ([place]) on 2021 in regards to current clinical state/code status.  All questions sought and answered. Julissa Jenkins are both requesting DNR status. Extensive discussion once again with patient's family on 2021 in regards to current clinical state/goals of care. All questions sought and answered. All family members in agreement with comfort care/hospice measures. Patient discharging to hospice today. Discharge Exam:   BP (!) 160/57   Pulse 73   Temp 97.8 °F (36.6 °C) (Temporal)   Resp 16   Ht 5' 8\" (1.727 m)   Wt 240 lb (108.9 kg)   SpO2 98%   BMI 36.49 kg/m²     General: no acute distress  HEENT: normocephalic  Neck: supple, symmetrical, trachea midline   Lungs: clear to auscultation bilaterally  Cardiovascular: s1 and s2 normal  Abdomen: soft, positive bowel sounds  Extremities: no edema  Neuro: intubated and sedated    Recent Labs     05/29/21  0535 05/30/21  0400 05/31/21  0340   WBC 19.4* 21.4* 20.4*   HGB 10.1* 9.7* 9.7*    144 137     Recent Labs     05/29/21 0535 05/30/21 0400 05/31/21  0340    146* 144   K 4.8 4.6 4.2    112* 110   CO2 22 21* 22   BUN 61* 39* 35*   CREATININE 2.0* 1.4* 1.3*   GLUCOSE 150* 175* 189*     No results for input(s): AST, ALT, ALB, BILITOT, ALKPHOS in the last 72 hours. Troponin T: No results for input(s): TROPONINI in the last 72 hours. Pro-BNP: No results for input(s): BNP in the last 72 hours. INR: No results for input(s): INR in the last 72 hours. UA:No results for input(s): NITRITE, COLORU, PHUR, LABCAST, WBCUA, RBCUA, MUCUS, TRICHOMONAS, YEAST, BACTERIA, CLARITYU, SPECGRAV, LEUKOCYTESUR, UROBILINOGEN, BILIRUBINUR, BLOODU, GLUCOSEU, AMORPHOUS in the last 72 hours. A1C: No results for input(s): LABA1C in the last 72 hours. ABG:No results for input(s): PHART, SOQ4MKD, PO2ART, BKQ2AYU, BEART, HGBAE, E0ONFFLZ, CARBOXHGBART in the last 72 hours.      Discharge Disposition: Hospice Facility    Time Spent on Discharge: 40 minutes    Sharla Corona MD  5/31/2021 6:05 PM

## 2021-05-31 NOTE — PROGRESS NOTES
Visited with pt's daughter and granddaughter to provide spiritual care. Pt had a significant stroke and family says she has aspiration pneumonia. Goal is for pt to go to Hospice for palliative extubation, comfort measures, this afternoon. Provided spiritual care with sustaining presence, empathic support, and prayer. Pt's daughter and granddaughter expressed gratitude for spiritual care.       Electronically signed by Rubi Preston on 5/31/2021 at 12:59 PM

## 2021-05-31 NOTE — PROGRESS NOTES
LE[facility] contacted to notify of withdrawal of care   Okay to withdrawal care at this time and call with cardiac time of death to reassess for tissue donation

## 2021-05-31 NOTE — PROGRESS NOTES
Called to visit with and provide spiritual care for additional family members. Provided sustaining presence, support with empathy, and prayer. Pt's family members expressed gratitude for spiritual care.     Electronically signed by Poncho English on 5/31/2021 at 2:36 PM

## 2021-05-31 NOTE — CONSULTS
**Physician Signature**  This document was electronically signed by: Drake Berg MD  2021   11:26 AM    **Consult Information**  Member Facility: [address] [place] [address] MRN: [id number]  Visit/Encounter Number: [id number]  Consult ID: [id number]  Facility Time Zone: CT  Date and Time of Request: 2021 06:39 AM  Requesting Clinician: Federico Cruz MD  Patient Name: Shona Umanzor  YOB: 1950  Gender: Female  Patient identity was confirmed at the beginning of the consult with the   patient/family/staff using two personal identifiers: Patient name and       **Admission**  Admission Date: 2021  Chief reason for ICU admission: Altered Mental Status  Other reason for ICU admission: Acute CVA    **Core Metrics**  General orienting sentence for patient: 72 yo female with MS changes PROSPER,   intubated for airway protection, on LR/abx for aspiration pna noted acute   CVA. Not alert, not following commands. Propofol re-added   because of perceived discomfort on vent. Remains on 15 of propofol. Agitated   but noninteractive when off. Biting on   ETT. Lots of thin secretions. PRN oral atropine drops neurologically. have   nonpurposeful movements and propofol back on b/o perceived agitation. BP skyrocketed, better on propofol 15. ? Pre[facility] hospice. and discomfort. Still vented. Family considering options. No interaction,   does have nonpurposeful movements. Lots of secretions, had mucus plug   overnight.  Scopalamine patch has replaced atropine  Chief physiologic deterioration: Change in mental status  Is the patient on DVT prophylaxis?: Yes  Prophylaxis type: Mechanical, Pharmacological  DVT Prophylaxis Comments: Lovenox  Is the patient on GI prophylaxis?: Yes  Gi Prophylaxis Comments: Pepcid  Has this patient reached their nutritional goal?: No and issues are being   addressed  Nutritional Goals Details: Trickle feeds 30 cc/hr  Are there current issues with pain management in this patient?: Yes and   issues are being addressed  Pain management notes: Appears uncomfortable, ? analgesics to replace   propofol  Are there issues with skin integrity?: Yes and issues are being   addressed  Skin Integrity Details: Sacral erythema, adipose folds with topical   tx. Are there issues with delirium?: Yes and issues are being addressed  Has the patient been mobilized?: No  Is this patient currently intubated?: Yes  Are there ethical or care philosophy or family issues?: Yes  Family Issues Details: Daughter considering withdrawal of support.   Do you recommend an in depth evaluation?: No  Do you recommend the patient should: : Continue ICU level of care    **Inserted/Removed Devices**  Device Name: Briones Catheter  Site of insertion: Bladder  Date of insertion: Unknown  Device Name: Endotracheal Tube  Site of insertion: Trachea  Date of insertion: Unknown  Device Name: Orogastric Tube  Site of insertion: Oropharynx  Date of insertion: 05-  Device Name: Central venous catheter  Site of insertion: Interjugular - Right  Date of insertion: 05-

## 2021-05-31 NOTE — CONSULTS
Yes and issues are being addressed  Has the patient been mobilized?: No  Is this patient currently intubated?: Yes  Are there ethical or care philosophy or family issues?: Yes  Family Issues Details: Daughter considering withdrawal of support.   Do you recommend an in depth evaluation?: No  Do you recommend the patient should: : Continue ICU level of care    **Inserted/Removed Devices**  Device Name: Briones Catheter  Site of insertion: Bladder  Date of insertion: Unknown  Device Name: Endotracheal Tube  Site of insertion: Trachea  Date of insertion: Unknown  Device Name: Orogastric Tube  Site of insertion: Oropharynx  Date of insertion: 05-  Device Name: Central venous catheter  Site of insertion: Interjugular - Right  Date of insertion: 05-

## 2021-05-31 NOTE — PROGRESS NOTES
Nephrology ([address] [facility]) Progress Note    Patient:  Crystal Trujillo  YOB: 1950  Date of Service: 5/31/2021  MRN: [id number]   Acct: [de-identified]   Primary Care Physician: RODRIGO Raimrez CNP  Advance Directive: DNR-CC  Admit Date: 5/27/2021       Hospital Day: 4  Referring Provider: Mikhail Rivera MD    Patient independently seen and examined, Chart, Consults, Notes, Operative notes, Labs, Cardiology, and Radiology studies reviewed as available. Chief complaint: Abnormal labs. Subjective:  Crystal Trujillo is a 73 y.o. female  whom we were consulted for acute kidney injury/hyperkalemia. Patient is currently intubated and sedated,  her medical history is obtained from the chart. 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 She was found home unresponsive on a vomit. Patient was intubated on the scene and transferred to [facility] ICU. She was diagnosed with pneumonia/acute kidney injury. Hospital course markable for IV fluid administration and has correction of renal function. She is currently receiving IV antibiotics. This morning she is intubated and sedated, family is leaning towards withdrawal of care versus hospice.     Allergies:  Aspirin, Biaxin [clarithromycin], Brethaire [terbutaline], Haemophilus influenzae vaccines, Other, and Pcn [penicillins]    Medicines:  Current Facility-Administered Medications   Medication Dose Route Frequency Provider Last Rate Last Admin    lisinopril (PRINIVIL;ZESTRIL) tablet 20 mg  20 mg Oral Daily Mikhail Rivera MD        metoprolol tartrate (LOPRESSOR) tablet 50 mg  50 mg Oral BID Mikhail Rivera MD   50 mg at 05/31/21 0825    enoxaparin (LOVENOX) injection 40 mg  40 mg Subcutaneous Q24H Mikhail Rivera MD   40 mg at 05/30/21 1243    famotidine (PEPCID) injection 20 mg  20 mg Intravenous BID Mikhail Rivera MD   20 mg at 05/31/21 0825    scopolamine (TRANSDERM-SCOP) transdermal patch 1 patch  1 patch Transdermal Q72H Lorna Mcgregor MD   1 patch at 05/30/21 1519    hydrALAZINE (APRESOLINE) injection 10 mg  10 mg Intravenous Q6H PRN Lorna Mcgregor MD   10 mg at 05/31/21 0824    labetalol (NORMODYNE;TRANDATE) injection 20 mg  20 mg Intravenous Q4H PRN Lorna Mcgregor MD   20 mg at 05/29/21 1846    cloNIDine (CATAPRES) 0.1 MG/24HR 1 patch  1 patch Transdermal Weekly Lorna Mcgregor MD   1 patch at 05/29/21 1713    vitamin D (ERGOCALCIFEROL) capsule 50,000 Units  50,000 Units Oral Weekly Lorna Mcgregor MD        PARoxetine (PAXIL) tablet 20 mg  20 mg Oral Daily Lorna Mcgregor MD   20 mg at 05/31/21 0825    atorvastatin (LIPITOR) tablet 20 mg  20 mg Oral Nightly Lorna Mcgregor MD   20 mg at 05/30/21 2038    atropine 1 % ophthalmic solution 1 drop  1 drop Sublingual Q4H PRN Lorna Mcgregor MD   1 drop at 05/28/21 1655    piperacillin-tazobactam (ZOSYN) 3,375 mg in dextrose 5 % 50 mL IVPB (mini-bag)  3,375 mg Intravenous Q6H Lorna Mcgregor MD   Stopped at 05/31/21 0820    insulin lispro (HUMALOG) injection vial 0-6 Units  0-6 Units Subcutaneous TID WC Lorna Mcgregor MD   1 Units at 05/30/21 4627    insulin lispro (HUMALOG) injection vial 0-3 Units  0-3 Units Subcutaneous Nightly Lorna Mcgregor MD   1 Units at 05/30/21 2039    propofol injection  5-50 mcg/kg/min Intravenous Titrated Lorna Mcgregor MD 20.3 mL/hr at 05/31/21 0316 30 mcg/kg/min at 05/31/21 0316    glucose (GLUTOSE) 40 % oral gel 15 g  15 g Oral PRN Lorna Mcgregor MD        dextrose 50 % IV solution  12.5 g Intravenous PRN Lorna Mcgregor MD        glucagon (rDNA) injection 1 mg  1 mg Intramuscular PRN Lorna Mcgregor MD        dextrose 5 % solution  100 mL/hr Intravenous PRN Lorna Mcgregor MD        levothyroxine (SYNTHROID) tablet 112 mcg  112 mcg Oral Daily Lorna Mcgregor MD   112 mcg at 05/31/21 0825    [Held by provider] spironolactone (ALDACTONE) tablet 25 mg  25 mg Oral BID Lorna Mcgregor MD        furosemide (LASIX) tablet 20 mg  20 mg Oral Daily Patrice Yanez MD           Past Medical History:  Past Medical History:   Diagnosis Date    Abdominal pain     Anemia     Anxiety     Chronic back pain     Depression     Diverticulitis     Hyperlipidemia     Hypertension     Hypothyroidism     Obesity     Palliative care patient 05/28/2021    Renal insufficiency     Type 2 diabetes mellitus ([facility] [address]        Past Surgical History:  Past Surgical History:   Procedure Laterality Date    TOTAL KNEE ARTHROPLASTY         Family History  Family History   Problem Relation Age of Onset    Hypertension Mother     Diabetes Mother     Hypertension Father     Diabetes Father        Social History  Social History     Socioeconomic History    Marital status: Unknown     Spouse name: Not on file    Number of children: Not on file    Years of education: Not on file    Highest education level: Not on file   Occupational History    Not on file   Tobacco Use    Smoking status: Never Smoker    Smokeless tobacco: Never Used   Substance and Sexual Activity    Alcohol use: Never    Drug use: Never    Sexual activity: Not on file   Other Topics Concern    Not on file   Social History Narrative    Not on file     Social Determinants of Health     Financial Resource Strain:     Difficulty of Paying Living Expenses:    Food Insecurity:     Worried About Running Out of Food in the Last Year:     [address] in the Last Year:    Transportation Needs:     Lack of Transportation (Medical):      Lack of Transportation (Non-Medical):    Physical Activity:     Days of Exercise per Week:     Minutes of Exercise per Session:    Stress:     Feeling of Stress :    Social Connections:     Frequency of Communication with Friends and Family:     Frequency of Social Gatherings with Friends and Family:     Attends [employer] Services:     Active Member of Clubs or Organizations:     Attends Club or Organization Meetings:    Ryland Solomon Marital Status:    Intimate Partner Violence:     Fear of Current or Ex-Partner:     Emotionally Abused:     Physically Abused:     Sexually Abused:          Review of Systems:  Unobtainable as she is intubated and sedated.     Objective:  Patient Vitals for the past 24 hrs:   BP Temp Temp src Pulse Resp SpO2 Weight   05/31/21 1100    71 16 98 %    05/31/21 0824 (!) 168/69   75      05/31/21 0800 (!) 168/69 98 °F (36.7 °C) Temporal 73 18 98 %    05/31/21 0727    87 24 96 %    05/31/21 0700 (!) 147/62   78 15 96 %    05/31/21 0500 (!) 156/73   77 19 98 %    05/31/21 0400 (!) 166/65 97.9 °F (36.6 °C) Temporal 75 16 97 %    05/31/21 0330 (!) 170/58   83 14 98 %    05/31/21 0300 (!) 102/44   75 16 94 %    05/31/21 0211    82 14 94 %    05/31/21 0200 (!) 134/56   84 15 95 %    05/31/21 0130 (!) 175/75   80 20 97 %    05/31/21 0100 (!) 213/78   70 17 97 %    05/31/21 0000 (!) 157/64 97.8 °F (36.6 °C) Temporal 62 15 97 % 240 lb (108.9 kg)   05/30/21 2300 (!) 147/60   67 17 97 %    05/30/21 2230 (!) 144/63   67 15 97 %    05/30/21 2219    72 13 97 %    05/30/21 2200 (!) 158/53   69 16 96 %    05/30/21 2130 (!) 156/55   72 21 96 %    05/30/21 2100 (!) 178/59   81 18 96 %    05/30/21 2030 (!) 175/58   74 17 96 %    05/30/21 2000 (!) 176/58 96.9 °F (36.1 °C) Temporal 72 22 95 %    05/30/21 1930 (!) 178/66   76 18 95 %    05/30/21 1900 (!) 178/63   77 17 94 %    05/30/21 1850    77 19 94 %    05/30/21 1833     16 95 %    05/30/21 1832    81 16 96 %    05/30/21 1600 (!) 173/60 98.9 °F (37.2 °C)  82 16 96 %    05/30/21 1500 (!) 167/58   80 16 96 %    05/30/21 1424    82 16 96 %    05/30/21 1400 (!) 131/53   75 24 95 %    05/30/21 1300 (!) 151/48   90 21 96 %    05/30/21 1243 (!) 170/60   86      05/30/21 1200 (!) 164/59 98.8 °F (37.1 °C) Temporal 84 18 95 %        Intake/Output Summary (Last 24 hours) at 5/31/2021 1107  Last data filed at 5/31/2021 0800  Gross per 24 hour   Intake 2010.44 ml   Output 1700 ml   Net 310.44 ml     General: Intubated/sedated. HEENT: Normocephalic atraumatic head/orotracheal intubation. Neck: Supple with no JVD or carotid bruits. Chest:  clear to auscultation bilaterally  CVS: regular rate and rhythm  Abdominal: soft, nontender, normal bowel sounds  Extremities: no cyanosis or edema  Skin: warm and dry without rash    Labs:  BMP:   Recent Labs     05/29/21  0535 05/30/21  0400 05/31/21  0340    146* 144   K 4.8 4.6 4.2    112* 110   CO2 22 21* 22   BUN 61* 39* 35*   CREATININE 2.0* 1.4* 1.3*   CALCIUM 9.2 9.0 8.8     CBC:   Recent Labs     05/29/21  0535 05/30/21  0400 05/31/21  0340   WBC 19.4* 21.4* 20.4*   HGB 10.1* 9.7* 9.7*   HCT 31.4* 29.4* 30.2*   MCV 95.4 94.2 95.6    144 137     LIVER PROFILE: No results for input(s): AST, ALT, LIPASE, BILIDIR, BILITOT, ALKPHOS in the last 72 hours. Invalid input(s): AMYLASE,  ALB  PT/INR: No results for input(s): PROTIME, INR in the last 72 hours. APTT: No results for input(s): APTT in the last 72 hours. BNP:  No results for input(s): BNP in the last 72 hours. Ionized Calcium:No results for input(s): IONCA in the last 72 hours. Magnesium:  Recent Labs     05/29/21  0535 05/30/21  0400 05/31/21  0340   MG 1.9 2.0 2.2     Phosphorus:No results for input(s): PHOS in the last 72 hours. HgbA1C: No results for input(s): LABA1C in the last 72 hours. Hepatic: No results for input(s): ALKPHOS, ALT, AST, PROT, BILITOT, BILIDIR, LABALBU in the last 72 hours. Lactic Acid: No results for input(s): LACTA in the last 72 hours. Troponin: No results for input(s): CKTOTAL, CKMB, TROPONINT in the last 72 hours. ABGs:   Lab Results   Component Value Date    PHART 7.380 05/28/2021    PO2ART 233.0 05/28/2021    ECT1BMW 30.0 05/28/2021     CRP:  No results for input(s): CRP in the last 72 hours.   Sed Rate:  No results for input(s): SEDRATE in the last 72 hours. Culture Results:   Blood Culture Recent:   Recent Labs     05/27/21  1455   BC No Growth to date. Any change in status will be called. Urine Culture Recent : No results for input(s): LABURIN in the last 720 hours. Radiology reports as per the Radiologist  Radiology: ECHO Complete 2D W Doppler W Color    Result Date: 5/28/2021  Transthoracic Echocardiography Report (TTE)  Demographics   Patient Name  Merlin Wise Date of Study         05/28/2021   MRN           [id number]           Gender                Female   Date of Birth 1950       Room Number           [id number]   Age           73 year(s)   Height:       68 inches        Referring Physician   Sergio Ruiz   Weight:       247.01 pounds    Sonographer           Helayne Hamman, RDCS   BSA:          2.24 m^2         Interpreting          Solomon Camara MD                                 Physician   BMI:          37.56 kg/m^2  Procedure Type of Study   TTE procedure:ECHO 2D W/DOPPLER/COLOR/CONTRAST. Study Location: Echo Lab Technical Quality: Poor visualization due to patient on ventilator. Patient Status: Inpatient Contrast Medium: Definity. Indications:CVA. Conclusions   Summary  Structurally normal mitral valve with normal leaflet mobility. No evidence  of mitral valve stenosis or mild mitral regurgitation. Aortic valve appears to be tricuspid. Structurally normal aortic valve. No significant aortic regurgitation or stenosis is noted. Tricuspid valve is structurally normal.  No evidence of tricuspid regurgitation. Normal size left atrium. Normal left ventricular size with preserved LV function and an estimated  ejection fraction of approximately 55-60%. No evidence of left ventricular mass or thrombus noted. Normal right atrial dimension with no evidence of thrombus or mass noted. Normal right ventricular size with preserved RV function.    Signature   ---------------------------------------------------------------- Electronically signed by Kala Coyne MD(Interpreting  physician) on 05/28/2021 05:00 PM  ----------------------------------------------------------------   Findings   Mitral Valve  Structurally normal mitral valve with normal leaflet mobility. No evidence  of mitral valve stenosis or mild mitral regurgitation. Aortic Valve  Aortic valve appears to be tricuspid. Structurally normal aortic valve. No significant aortic regurgitation or stenosis is noted. Tricuspid Valve  Tricuspid valve is structurally normal.  No evidence of tricuspid regurgitation. Left Atrium  Normal size left atrium. Left Ventricle  Normal left ventricular size with preserved LV function and an estimated  ejection fraction of approximately 55-60%. No evidence of left ventricular mass or thrombus noted. Right Atrium  Normal right atrial dimension with no evidence of thrombus or mass noted. Right Ventricle  Normal right ventricular size with preserved RV function. M-Mode Measurements (cm)   % Ejection Fraction: 60 %      XR ABDOMEN (KUB) (SINGLE AP VIEW)    Result Date: 5/28/2021  Exam: XR ABDOMEN (KUB) (SINGLE AP VIEW) - 5/28/2021 3:29 PM Indication: Evaluate for acute process, abdominal pain Comparison: None available. Findings: The bowel gas pattern is nonobstructive in nature. No mass effect or suspicious calcifications. Surgical clips in the RIGHT upper abdomen are present. Enteric tube tip is in the distal stomach. No suspicious osseous lesion. Impression: 1. Nonobstructive bowel gas pattern. 2.  Enteric tube is in the distal stomach. Signed by Dr Nacho Levy on 5/28/2021 4:45 PM    US RENAL COMPLETE    Result Date: 5/28/2021  Exam: US RENAL COMPLETE - 5/28/2021 6:27 AM Indication: Acute kidney injury Comparison: None available. Findings: Exam is limited by patient body habitus and difficulties with patient positioning.  The RIGHT kidney measures 9.2 cm in length and demonstrates normal cortical thickness and echogenicity. No visible shadowing calculi or hydronephrosis. The LEFT kidney was unable to be diagnostically visualized. Urinary bladder is decompressed limiting evaluation. 1.  RIGHT kidney appears unremarkable. 2.  LEFT kidney was unable to be diagnostically visualized secondary to patient body habitus and difficulties with positioning. Signed by Dr Suyapa Guadarrama on 5/28/2021 8:22 AM    XR CHEST PORTABLE    Result Date: 5/28/2021  Examination. XR CHEST PORTABLE 5/28/2021 4:33 AM History: The patient on a vent. A single, frontal, portable, upright view of the chest is compared with the previous study dated 5/27/2021. The distal end of the endotracheal tube is at the level of durga and projecting into the proximal part of the right mainstem bronchus. No significant change in the previous study. A 3 to 4 cm withdrawal is recommended. There is a persistent right lower lung consolidation. A right sided PICC line is seen in place with distal end in the distal superior vena cava. Nasogastric tube is seen. The distal end is not visualized. Endotracheal tube at the level of tracheal bifurcation. No change in position since the previous study. A 3 to 4 cm withdrawal of the tube is suggested. Right lower lung consolidation. The results were called to the nurse in charge in, Tima Tillman, in ICU. Signed by Dr Stephanie Arce on 5/28/2021 7:30 AM    XR CHEST PORTABLE    Result Date: 5/27/2021  Exam: XR CHEST PORTABLE - 5/27/2021 2:33 PM Indication: ETT placement and RIGHT central venous line placement Comparison: None available. Findings: Endotracheal tube is at the durga pointed towards the RIGHT mainstem bronchus, consider retraction by 2 to 3 cm. RIGHT sided CVL tip overlies the low SVC. Cardiac silhouette is mildly enlarged. RIGHT mid to lower lung zone patchy airspace opacity. LEFT basilar atelectasis. No large pleural effusion. No pneumothorax.     1.  Endotracheal tube is at the durga pointed towards the RIGHT mainstem bronchus, consider retraction by 2 to 3 cm. 2.  RIGHT sided CVL tip overlies the low SVC. 3.  RIGHT lower lobe airspace opacity, with differential including pneumonia and aspiration. Signed by Dr Stephanie Katz on 5/27/2021 3:54 PM    VL DUP CAROTID BILATERAL    Result Date: 5/29/2021  Vascular Carotid Procedure  Demographics   Patient Name    Jamal Sharpe Age                   73   Patient Number  [id number]           Gender                Female   Visit Number    [id number]        Interpreting          Roberto Griffin MD                                   Physician   Date of Birth   1950       Referring Physician   Dung Perrin   Accession       [id number]       [address] [facility], [facility]  Number  Procedure Type of Study:   Cerebral:Carotid, VL CAROTID BILATERAL. Indications for Study:CVA. Risk Factors   - The patient's risk factor(s) include: diabetes mellitus and obesity. Impression   There is smooth plaque visualized in the bilateral internal carotid  artery(ies). There is less than 50% stenosis in the right internal carotid artery. There is less than 50% stenosis of the left internal carotid artery. There is normal antegrade flow in the bilateral vertebral artery(ies). Signature   ----------------------------------------------------------------  Electronically signed by Roberto Griffin MD(Interpreting  physician) on 05/29/2021 08:05 AM  ----------------------------------------------------------------  Blood Pressure:Left arm 190/70 mmHg. Velocities are measured in cm/s ; Diameters are measured in mm Carotid Right Measurements +------------+-------+-------+--------+-------+------------+---------------+ ! Location    ! PSV    ! EDV    ! Angle   ! RI     !%Stenosis   ! Tortuosity     ! +------------+-------+-------+--------+-------+------------+---------------+ ! Prox CCA    !101    !17.7   !        !0.82   !            !               ! +------------+-------+-------+--------+-------+------------+---------------+ ! Mid CCA     !94.1   !21.2   !        !0.77   !            !               ! +------------+-------+-------+--------+-------+------------+---------------+ ! Prox ICA    !65.2   !18.4   !        !0.72   !            !               ! +------------+-------+-------+--------+-------+------------+---------------+ ! Mid ICA     !63.7   !23.4   !        !0.63   !            !               ! +------------+-------+-------+--------+-------+------------+---------------+ ! Dist ICA    ! 84.9   !33.3   !        !0.61   !            !               ! +------------+-------+-------+--------+-------+------------+---------------+ ! Prox ECA    ! 156    !26.1   !        !0.83   !            !               ! +------------+-------+-------+--------+-------+------------+---------------+ ! Vertebral   !66.4   !19.9   !        !0.7    ! !               ! +------------+-------+-------+--------+-------+------------+---------------+   - There is antegrade vertebral flow noted on the right side. - Additional Measurements:ICAPSV/CCAPSV 0.84. ICAEDV/CCAEDV 1.88. Carotid Left Measurements +------------+-------+-------+--------+-------+------------+---------------+ ! Location    ! PSV    ! EDV    ! Angle   ! RI     !%Stenosis   ! Tortuosity     ! +------------+-------+-------+--------+-------+------------+---------------+ ! Prox CCA    !103    !32.5   !        !0.68   !            !               ! +------------+-------+-------+--------+-------+------------+---------------+ ! Mid CCA     !105    !28.3   !        !0.73   !            !               ! +------------+-------+-------+--------+-------+------------+---------------+ ! Prox ICA    ! 89.9   !24.8   !        !0.72   !            !               ! +------------+-------+-------+--------+-------+------------+---------------+ ! Mid ICA     !88.8   !34.4   !        !0.61   !            !               ! +------------+-------+-------+--------+-------+------------+---------------+ ! Dist ICA    ! 74.7   !29     !        !0.61   !            !               ! +------------+-------+-------+--------+-------+------------+---------------+ ! Prox ECA    !117    !11.4   !        !0.9    ! !               ! +------------+-------+-------+--------+-------+------------+---------------+ ! Vertebral   !67.2   !22.6   !        !0.66   !            !               ! +------------+-------+-------+--------+-------+------------+---------------+   - There is antegrade vertebral flow noted on the left side. - Additional Measurements:ICAPSV/CCAPSV 0.87. ICAEDV/CCAEDV 1.06. MRI BRAIN WO CONTRAST    Result Date: 5/28/2021  EXAMINATION:  MRI BRAIN WO CONTRAST  5/28/2021 9:35 AM HISTORY: Clinical concerns for stroke COMPARISON: No comparison study. TECHNIQUE: Multiplanar MR imaging the brain was performed. FINDINGS: There is moderate patchy restricted diffusion signal abnormality appreciated in the posterior right temporal lobe extending superiorly along the convexity involving the right frontal lobe posteriorly compatible with acute ischemic change/infarction, right MCA vascular congestion spotting FLAIR T2 signal hyperintensities. There is no MR evidence of abnormal intra-axial or extra-axial blood products or hemorrhagic change. There is mild sulcal effacement associated with the infarction without significant mass effect. There is no midline shift. There is no hydrocephalus. No additional T1 or T2 signal abnormalities observed brain. Pituitary gland is nonenlarged. The globes and intraorbital structures are imaged symmetrically. Cervical spine imaged in part is unremarkable. There is mucosal thickening/inflammatory change in the sphenoid and ethmoid sinuses. 1. Acute right MCA distribution infarct. 2. Paranasal sinus disease. Signed by Dr Em Chamberlain on 5/28/2021 9:40 AM       Assessment   1.   Acute kidney injury/stage

## 2021-05-31 NOTE — CARE COORDINATION
Patient eyes open, biting down on ETT. Bucking ventilator without relief. Diprivan increased. Patient appears more comfortable with dose change.

## 2021-05-31 NOTE — PROGRESS NOTES
Hospitalist Progress Note  [facility]     Patient: Deepa Talavera  : 1950  MRN: [id number]  Code Status: [facility] Day: 4   Date of Service: 2021    Subjective:   Patient seen and examined. Intubated and sedated. Past Medical History:   Diagnosis Date    Abdominal pain     Anemia     Anxiety     Chronic back pain     Depression     Diverticulitis     Hyperlipidemia     Hypertension     Hypothyroidism     Obesity     Palliative care patient 2021    Renal insufficiency     Type 2 diabetes mellitus ([address]        Past Surgical History:   Procedure Laterality Date    TOTAL KNEE ARTHROPLASTY         Family History   Problem Relation Age of Onset    Hypertension Mother     Diabetes Mother     Hypertension Father     Diabetes Father        Social History     Socioeconomic History    Marital status: Unknown     Spouse name: Not on file    Number of children: Not on file    Years of education: Not on file    Highest education level: Not on file   Occupational History    Not on file   Tobacco Use    Smoking status: Never Smoker    Smokeless tobacco: Never Used   Substance and Sexual Activity    Alcohol use: Never    Drug use: Never    Sexual activity: Not on file   Other Topics Concern    Not on file   Social History Narrative    Not on file     Social Determinants of Health     Financial Resource Strain:     Difficulty of Paying Living Expenses:    Food Insecurity:     Worried About Running Out of Food in the Last Year:     Ran Out of Food in the Last Year:    Transportation Needs:     Lack of Transportation (Medical):      Lack of Transportation (Non-Medical):    Physical Activity:     Days of Exercise per Week:     Minutes of Exercise per Session:    Stress:     Feeling of Stress :    Social Connections:     Frequency of Communication with Friends and Family:     Frequency of Social Gatherings with Friends and Family:     Attends Mosque Services:     Active Member of Clubs or Organizations:     Attends Club or Organization Meetings:     Marital Status:    Intimate Partner Violence:     Fear of Current or Ex-Partner:     Emotionally Abused:     Physically Abused:     Sexually Abused:        Current Facility-Administered Medications   Medication Dose Route Frequency Provider Last Rate Last Admin    metoprolol tartrate (LOPRESSOR) tablet 50 mg  50 mg Oral BID Milla French MD   50 mg at 05/31/21 0825    enoxaparin (LOVENOX) injection 40 mg  40 mg Subcutaneous Q24H Milla French MD   40 mg at 05/30/21 1243    famotidine (PEPCID) injection 20 mg  20 mg Intravenous BID Milla French MD   20 mg at 05/31/21 0825    scopolamine (TRANSDERM-SCOP) transdermal patch 1 patch  1 patch Transdermal Q72H Milla French MD   1 patch at 05/30/21 1519    hydrALAZINE (APRESOLINE) injection 10 mg  10 mg Intravenous Q6H PRN Milla French MD   10 mg at 05/31/21 0824    labetalol (NORMODYNE;TRANDATE) injection 20 mg  20 mg Intravenous Q4H PRN Milla French MD   20 mg at 05/29/21 1846    cloNIDine (CATAPRES) 0.1 MG/24HR 1 patch  1 patch Transdermal Weekly Milla French MD   1 patch at 05/29/21 1713    vitamin D (ERGOCALCIFEROL) capsule 50,000 Units  50,000 Units Oral Weekly Milla French MD        PARoxetine (PAXIL) tablet 20 mg  20 mg Oral Daily Milla French MD   20 mg at 05/31/21 0825    atorvastatin (LIPITOR) tablet 20 mg  20 mg Oral Nightly Milla French MD   20 mg at 05/30/21 2038    atropine 1 % ophthalmic solution 1 drop  1 drop Sublingual Q4H PRN Milla French MD   1 drop at 05/28/21 1655    piperacillin-tazobactam (ZOSYN) 3,375 mg in dextrose 5 % 50 mL IVPB (mini-bag)  3,375 mg Intravenous Q6H Milla French MD   Stopped at 05/31/21 0820    insulin lispro (HUMALOG) injection vial 0-6 Units  0-6 Units Subcutaneous TID WC Milla French MD   1 Units at 05/30/21 0814    insulin lispro (HUMALOG) injection vial 0-3 Units  0-3 0535 05/30/21  0400 05/31/21  0340   MG 1.9 2.0 2.2     No results for input(s): AST, ALT, ALB, BILITOT, ALKPHOS, ALB in the last 72 hours. No results for input(s): PH, PO2, PCO2, HCO3, BE, O2SAT in the last 72 hours. No results for input(s): TROPONINI in the last 72 hours. No results for input(s): INR in the last 72 hours. No results for input(s): LACTA in the last 72 hours. Intake/Output Summary (Last 24 hours) at 5/31/2021 1017  Last data filed at 5/31/2021 0800  Gross per 24 hour   Intake 2010.44 ml   Output 1700 ml   Net 310.44 ml       No results found. Assessment and Plan:   Acute right MCA infarct  Per MRI 5/28/2021  Last known well time midnight 5/27/2021  Daughter found patient at 8 AM 5/27/2021 unresponsive/covered in vomit/RUE contracted  Neurology following  History of DM2/HLD/HTN/morbid obesity  HbA1c 6.3  TSH 0.44  FLP reviewed  TTE reviewed  Neurochecks  PT/OT/SLP once able  Statin  Aspirin allergy     Aspiration pneumonia versus CAP  Empiric broad-spectrum antibiotics  Follow cultures  Molecular respiratory panel negative     Sepsis  Suspect secondary to above processes  Empiric broad-spectrum antibiotics  Follow cultures  IVF  Lactate 2.0     Ventilator dependent acute respiratory failure  Secondary to above processes  Titrate minute ventilation for pH 7.35  Follow ABG/CXR  SBT once able     PROSPER/hyperkalemia  Unsure of baseline creatinine, improving  Hyperkalemia resolved  Renal following  Avoid offending agents  Follow renal function/urine output/electrolytes     DM2  Meds on board     Morbid obesity     DVT prophylaxis  Lovenox     Extensive discussion with patient's family in regards to current clinical state.  All questions sought and answered. Extensive discussion with patient's daughter Marla Moreno) and sister (Karri Yost) on 5/30/2021 in regards to current clinical state/code status. All questions sought and answered. They are both requesting DNR status.     Total critical care time: 51 minutes    Patrice Yanez MD   5/31/2021 10:17 AM

## 2021-06-01 PROBLEM — I63.511 ACUTE ISCHEMIC RIGHT MCA STROKE (HCC): Status: ACTIVE | Noted: 2021-01-01

## 2021-06-01 NOTE — PROCEDURES
[facility] [address] [facility]                          ELECTROENCEPHALOGRAM REPORT    PATIENT NAME: Tommy Heredia                   :        1950  MED REC NO:   [id number]                              ROOM:       [facility]  ACCOUNT NO:   [de-identified]                           ADMIT DATE: 2021  PROVIDER:     Sussy Olivares MD    DATE OF EE2021  Indication for test: Altered mental status. Patient is drowsy during the test  SUMMARY:  Initially, the background is severely attenuated with  bilateral delta activity. Ultimately, a 5 to 6 Hz activity is seen at  times. No focal slowing nor any epileptiform activity is noted. IMPRESSION:  Very slow EEG which could be due to medication effect or  nonspecific encephalopathy. No epileptiform activity noted. Correlate  clinically.         Natasha Keating MD    D: 2021 10:30:46      T: 2021 10:57:45     [id number]  Job#: [id number]     Doc#: [id number]    CC:

## 2021-06-22 ENCOUNTER — TRANSCRIBE ORDERS (OUTPATIENT)
Dept: LAB | Facility: HOSPITAL | Age: 71
End: 2021-06-22

## 2024-11-18 NOTE — PROGRESS NOTES
Pt remains on vent, sedated on Propofol. Pt is DNR-CC  Scopolamine patch placed. MRI shows right MCA infarct. Family at bedside. Family asking for comfort measures. They understand aggressive tx will cease and pt will be managed with comfort meds. Family also understands should pt linger placement will be needed. Dtr states she will take pt home with her and care for her if this is the case. Awaiting family to arrive this afternoon. Family asking for  to come and pray. PC  aware and will meet family this afternoon. Family tearful. Emotional support and listening presence provided. HMD aware, awaiting ok for transfer. SBAR to be completed and sent.     Electronically signed by Carmenza Valencia RN on 5/31/2021 at 11:33 AM [FreeTextEntry1] : #History of left breast cancer with no evidence of disease .                                                                                                 #low B12 and is on replacement.    #Osteoporosis on vitamin D, Calcium  Bone density 5/15/24 results show osteopenia with T-score 2.2  #COPD . pulmonary nodules , followed by pulmonary Last CT chest 10/29/24 shows 1.8cm right lower lobe subsolid/groundglass nodule, increased in size since 1/3/24 and 8/14/23. Additionally, b/l 4mm and smaller lung nodules, unchanged size, some resolved since 1/3/24.   Plan : repeat right mammogram, last mammo in November, 2023            PET scan scheduled for tomorrow           F/u with Dr. Hernandez           continue taking Vit D and calcium            follow up in one year